# Patient Record
Sex: FEMALE | Race: WHITE
[De-identification: names, ages, dates, MRNs, and addresses within clinical notes are randomized per-mention and may not be internally consistent; named-entity substitution may affect disease eponyms.]

---

## 2017-01-09 ENCOUNTER — HOSPITAL ENCOUNTER (OUTPATIENT)
Dept: HOSPITAL 45 - C.MAMM | Age: 77
Discharge: HOME | End: 2017-01-09
Attending: FAMILY MEDICINE
Payer: COMMERCIAL

## 2017-01-09 DIAGNOSIS — Z12.31: Primary | ICD-10-CM

## 2017-01-10 NOTE — MAMMOGRAPHY REPORT
BILATERAL DIGITAL SCREENING MAMMOGRAM WITH CAD: 1/9/2017

CLINICAL HISTORY: Routine screening.  Patient has no complaints.  





TECHNIQUE: Bilateral CC and MLO views were obtained.  Current study was also evaluated with a Comput
er Aided Detection (CAD) system.  



COMPARISON: Comparison is made to exams dated:  1/7/2016 mammogram, 12/4/2014 mammogram, 12/3/2013 m
ammogram, and 11/29/2012 mammogram - [facility].   



BREAST COMPOSITION:  There are scattered areas of fibroglandular density in both breasts.  



FINDINGS:  There are benign coarse calcifications scattered bilaterally, and stable circumscribed rojas
bcentimeter masses within the right breast.  However, there are possible new faint clustered microca
lcifications in the lower inner middle one third of the left breast, for which additional spot magni
fication views are recommended.



No other new suspicious suspicious mass, architectural distortion or cluster of microcalcifications 
is seen bilaterally.  



IMPRESSION:  ACR BI-RADS CATEGORY 0: INCOMPLETE EVALUATION:  NEED ADDITIONAL IMAGING EVALUATION

The possible new faint clustered microcalcifications in the left breast need additional evaluation. 
 

The patient will be called to schedule an appointment.  





Approximately 10% of breast cancers are not detected with mammography. A negative mammographic repor
t should not delay biopsy if a clinically suggestive mass is present.



Ariana De La Paz M.D.          

ay/:1/9/2017 15:16:13  



Imaging Technologist: Sahara Paz, [facility]

letter sent: Addl Imaging 0  

BI-RADS Code: ACR BI-RADS Category 0: Incomplete Evaluation:  Need Additional Imaging Evaluation

## 2017-01-20 ENCOUNTER — HOSPITAL ENCOUNTER (OUTPATIENT)
Dept: HOSPITAL 45 - C.MAMM | Age: 77
Discharge: HOME | End: 2017-01-20
Attending: FAMILY MEDICINE
Payer: COMMERCIAL

## 2017-01-20 DIAGNOSIS — R92.0: Primary | ICD-10-CM

## 2017-01-20 NOTE — MAMMOGRAPHY REPORT
UNILATERAL LEFT DIGITAL DIAGNOSTIC MAMMOGRAM: 1/20/2017

CLINICAL HISTORY: Callback from screening mammogram for left breast calcifications.  





TECHNIQUE:  Spot magnification left CC and ML views were obtained.



COMPARISON: Comparison is made to exams dated:  1/9/2017 mammogram, 1/7/2016 mammogram, 12/4/2014 ma
mmogram, 12/3/2013 mammogram, 11/29/2012 mammogram, and 11/28/2011 mammogram - [facility].   



BREAST COMPOSITION:  There are scattered areas of fibroglandular density in the left breast.  



FINDINGS: Spot magnification views of the left breast demonstrate loosely grouped coarse benign calc
ifications in the left medial breast at approximately 9:00 which are not significantly changed shaw
red to prior exams.  Associated with the coarse calcifications are grouped faint punctate calcificat
ions which were not clearly seen on prior exams.  The total extent of the fainter calcifications kimberley
sures 10 mm.  The coarse calcifications have the appearance of possible dystrophic calcifications fr
om fat necrosis; the patient reports a prior surgical excisional biopsy of the left breast many year
s ago but is not sure of the location in the breast and a scar is no longer visible on the skin.  As
 the fainter calcifications are in association with the more benign-appearing calcifications, they a
re likely part of the same process and may represent developing dystrophic calcifications.  However,
 they are indeterminate at this time.  Recommend stereotactic biopsy for further evaluation.





IMPRESSION:  ACR BI-RADS CATEGORY 4: SUSPICIOUS

Faint 10 mm group of calcifications in the left 9:00 breast, which are in association with coarse be
nign-appearing calcifications which are likely dystrophic calcifications.  Although they may be part
 of the same process and may represent evolving dystrophic calcifications, they are indeterminant an
d given that they are new, stereotactic biopsy is recommended for further evaluation.  



The patient has been verbally notified of the results.  She didn tentatively scheduled the biopsy be
fore leaving the department. A call was made to the physician's office to confirm faxed results were
 received.  





Approximately 10% of breast cancers are not detected with mammography. A negative mammographic repor
t should not delay biopsy if a clinically suggestive mass is present.



Uma Alfaro M.D.          

ah/:1/20/2017 10:08:03  



Imaging Technologist: Sahara Paz, [facility]

letter sent: Abnormal 4/5  

BI-RADS Code: ACR BI-RADS Category 4: Suspicious

## 2017-01-30 ENCOUNTER — HOSPITAL ENCOUNTER (OUTPATIENT)
Dept: HOSPITAL 45 - C.MAMM | Age: 77
Discharge: HOME | End: 2017-01-30
Attending: FAMILY MEDICINE
Payer: COMMERCIAL

## 2017-01-30 DIAGNOSIS — R92.0: Primary | ICD-10-CM

## 2017-01-30 DIAGNOSIS — N60.92: ICD-10-CM

## 2017-01-30 DIAGNOSIS — N60.22: ICD-10-CM

## 2017-01-30 NOTE — DISCHARGE INSTRUCTIONS
Discharge Instructions


Procedure


Procedure Date:


Jan 30, 2017.


Reason for visit:


Left Calcs.





Discharge


Discharge Date:


Jan 30, 2017.


Discharge Diagnosis:


post left breast stereotactic guided biopsy





Medications


Restart Stopped Medication(s):


May restart Aspirin today





Instructions


Activity Recommendations:  Additional Limitations (see below)


Return to School/Work:  no limitations


Recommended Home Diet:  No Limitations


Provider Instructions:





ACTIVITY RECOMMENDATIONS:





*  No lifting, pushing, pulling or exercising the affected side for three days.








RETURN TO SCHOOL/WORK:





*  You may return to work/school after the procedure, but do not perform any 

strenuous


   activities for 24 to 48 hours.








MEDICATIONS:





*  Tylenol (two 325 mg) every four to six hours if needed for mild pain (if not 

allergic to Tylenol).








DIET:





*  Resume previous diet.








SPECIAL CARE INSTRUCTIONS:





*  Keep biopsy site dry for 24 hours.  May shower after 24 hours, but do not 

soak (bathe)


   incision.





*  May remove Tegaderm (plastic patch) tomorrow AFTER showering.





*  Leave the steri-strips on for one week.  Allow the steri-strips to fall off 

by themselves.  


   If not off after one week, you may remove them.  You may place a Bandaid


   crosswise over the strips, if desired.





*  Apply ice 10 minutes on and 10 minutes off as needed.





*  Wear a bra at bedtime to sleep more comfortably for 2-3 days.





*  Your referring physician should have the results after approximately 5 to 7 

business days.





*  Call for unusual bleeding, fever, drainage, etc or if you have any questions 

call


    [phone] during normal business hours or after hours call Dr De La Paz, 

[phone].








FOLLOW UP VISIT:





Follow-up with Referring Physician as scheduled.





Allergies


Coded Allergies:  


     No Known Allergies (Verified , 8/28/16)


Tereso Chairez Recommendations:


 


Call your doctor if:


*  Temperature above 101 degrees


*  Pain not relieved by pain medicine ordered


*  There is increased drainage or redness from any incision


*  You have any unanswered questions or concerns.





Your Doctors Instructions noted above were prepared by provider Ariana De La Paz.


Patient Signature Section:


 Patient Instructions Signature Page








Jeanne Klinefelter 











Patient (or Guardian) Signature/Date:____________________________________ I 

have read and understand the instructions given to me by my caregivers.








Caregiver/RN/Doctor Signature/Date:____________________________________








The above-named patient and/or guardian has received patient instructions on 

this date.


























+  Original Patient Signature Page (only) stays with chart.  Please make copy 

for patient.

## 2017-01-30 NOTE — MAMMOGRAPHY REPORT
STEREOTACTIC GUIDED BIOPSY LEFT BREAST: 1/30/2017

CLINICAL HISTORY: Indeterminate faint clustered microcalcifications in the lower inner quadrant of t
he left breast near larger dystrophic calcifications.  Patient presents for stereotactic guided biop
sy.  



COMPARISON: Comparison is made to exams dated:  1/20/2017 mammogram, 1/9/2017 mammogram, 12/4/2014 m
ammogram, 12/3/2013 mammogram, 11/28/2011 mammogram, and 11/22/2010 mammogram - [facility].  



PATIENT CONSENT: After explaining the risks, benefits and alternatives of the procedure to the patie
nt, informed consent was obtained both verbally and in writing.  Specific risks include: Bleeding, i
nfection, puncture of adjacent structure, nontarget biopsy, sampling error and medication reaction. 
 



PROCEDURE DESCRIPTION: A time-out was performed and the left breast was confirmed as the site of bio
psy. The patient was placed prone on the stereotactic biopsy table and the breast was placed in CC f
rom below compression. A  image was obtained that demonstrated the clustered microcalcification
s in question.  They are amenable to sterotactic biopsy. Then +15 and -15 stereo pair images were 
obtained. The calcifications were targeted utilizing the coordinates obtained by the computer.  The 
skin was prepped with Betadine. 1% Lidocaine with and without epinipherine was administered as local
 anesthesia. A small skin incision was made.  Through the incision, the needle was inserted to the d
epth determined by the computer. 8 samples were obtained using a [employer] 9-gauge vacuum-assisted
 biopsy device. The specimen radiograph demonstrated no obvious microcalcifications.  Then 10 additi
onal core biopsy samples were obtained.  There was one definite microcalcification but a paucity of 
other microcalcifications in the samples.  A dumbbell shaped metallic biopsy marker was placed at th
e site of this biopsy.  Then the decision was made to re-target and try to obtain additional microca
lcifications.



The left breast was repositioned in CC from below compression. A  image was obtained that demon
strated the clustered microcalcifications in question.  They are amenable to sterotactic biopsy. The
n +15 and -15 stereo pair images were obtained. The calcifications were targeted utilizing the 
rdinates obtained by the computer.  The skin was prepped with Betadine. 1% Lidocaine with and withou
t epinipherine was administered as local anesthesia. A small skin incision was made.  Through the in
cision, the needle was inserted to the depth determined by the computer. 12 samples were obtained us
ing a [employer] 9-gauge vacuum-assisted biopsy device. The specimen radiograph demonstrated severa
l representative microcalcifications therefore a T-shaped metallic biopsy marker was placed at this 
site.  The samples were sent to the pathology department in 2 separate containers from the initial b
iopsy location and then the retargeted location.



Initial post procedure left CC and ML views were obtained which demonstrated a dumbbell shaped metal
lic biopsy marker and no significant hematoma in the lower outer anterior breast, slightly anterior 
to the expected location of the microcalcifications.  CC and ML views obtained after the second biop
sy demonstrated a 6.5 x 4.7 cm hematoma at the site of the biopsy.  Then additional manual compressi
on was held on the breast and the patient was wrapped in a pressure dressing with an Ace bandage bethany
or to leaving our department.







IMPRESSION: STEREOTACTIC GUIDED BIOPSY

Status post left breast stereotactic guided biopsy in the lower inner quadrant of faint punctate satnam
rocalcifications.  2 biopsy attempts were made given a paucity of microcalcifications within the loc
ation which was first sampled.  The specimen radiograph obtained at the second site of biopsy demons
trates adequate microcalcifications in the specimen.  This is denoted by a T-shaped metallic biopsy 
marker.



The patient will receive notification of the biopsy results from her referring physician.





Ariana De La Paz M.D.  

ay/:1/30/2017 15:19:49  



Attending Technologist: Jay TAPIA)(SUNITHA), [facility]

Imaging Technologist: Betty TAPIA)(SUNITHA), [facility]

## 2017-02-01 NOTE — MAMMOGRAPHY REPORT
UNILATERAL LEFT DIGITAL DIAGNOSTIC MAMMOGRAM: 1/30/2017

CLINICAL HISTORY: Indeterminate faint microcalcifications in the left lower inner quadrant, near lar
chance dystrophic appearing calcifications, that could represent fat necrosis, but DCIS cannot be exclu
ded.  



Please refer to the report from left breast stereotactic guided biopsy performed at the same time fo
r full detail.

IMPRESSION:  POST PROCEDURE IMAGING FOR MARKER PLACEMENT

Please refer to the report from left breast stereotactic guided biopsy performed at the same time fo
r full detail.



Approximately 10% of breast cancers are not detected with mammography. A negative mammographic repor
t should not delay biopsy if a clinically suggestive mass is present.



Ariana De La Paz M.D.          

ay/:1/30/2017 13:39:21  



Attending Technologist: Jay Hoover RT(R)(M), [facility]

Imaging Technologist: Betty Corona RT(R)(M), [facility]



BI-RADS Code: Post Procedure Imaging For Marker Placement

## 2017-04-04 ENCOUNTER — HOSPITAL ENCOUNTER (OUTPATIENT)
Dept: HOSPITAL 45 - C.GI | Age: 77
Discharge: HOME | End: 2017-04-04
Attending: INTERNAL MEDICINE
Payer: COMMERCIAL

## 2017-04-04 VITALS
WEIGHT: 165.35 LBS | HEIGHT: 60.98 IN | WEIGHT: 165.35 LBS | BODY MASS INDEX: 31.22 KG/M2 | HEIGHT: 60.98 IN | BODY MASS INDEX: 31.22 KG/M2

## 2017-04-04 VITALS — HEART RATE: 65 BPM | OXYGEN SATURATION: 99 % | DIASTOLIC BLOOD PRESSURE: 77 MMHG | SYSTOLIC BLOOD PRESSURE: 177 MMHG

## 2017-04-04 DIAGNOSIS — I10: ICD-10-CM

## 2017-04-04 DIAGNOSIS — Z86.010: ICD-10-CM

## 2017-04-04 DIAGNOSIS — Z79.82: ICD-10-CM

## 2017-04-04 DIAGNOSIS — E78.00: ICD-10-CM

## 2017-04-04 DIAGNOSIS — K64.8: ICD-10-CM

## 2017-04-04 DIAGNOSIS — E07.9: ICD-10-CM

## 2017-04-04 DIAGNOSIS — Z12.11: Primary | ICD-10-CM

## 2017-04-04 DIAGNOSIS — Z79.899: ICD-10-CM

## 2017-04-04 NOTE — DISCHARGE INSTRUCTIONS
Endoscopy Patient Instructions


Date / Procedure(s) Performed


Apr 4, 2017.


Colonoscopy





Allergy Information


Coded Allergies:  


     No Known Allergies (Verified , 4/4/17)





Discharge Date / Findings


Apr 4, 2017.


Internal hemorrhoids


Normal appearance of anastomosis





Medication Instructions


Stopped Medication(s):  


ASA


OK to resume all medications today as prescribed





 Reported Home Medications








 Medications  Dose


 Route/Sig


 Max Daily Dose Days Date Category


 


 Calcium + D


  (Calcium


 Carbonate-Vitamin


 D) 1 Tab Tab  2 Tab


 PO HS


    3/31/17 Reported


 


 Dulcolax


  (Bisacodyl) 5 Mg


 Tab  1 Tab


 PO QAM


    3/31/17 Reported


 


 Zofran


  (Ondansetron HCl)


 8 Mg Tab  8 Mg


 SL Q6H PRN


    3/31/17 Reported


 


 Fosamax+D


 70MG/2800 Iu


  (Alendronate


 Sodium/Vitamin


 D3) 70 Mg Tab  1 Tablet


 PO WK


    3/31/17 Reported


 


 Tylenol


  (Acetaminophen)


 325 Mg Tab  650 Mg


 PO Q4 PRN


    7/20/16 Reported


 


 Zestoretic


 20MG/12.5MG


  (HCTZ/Lisinopril)


 Tab  1 Tab


 PO QAM


    7/20/16 Reported


 


 Aspirin Ec


  (Aspirin) 81 Mg


 Tab  81 Mg


 PO QAM


    5/8/15 Reported


 


 Lipitor


  (Atorvastatin


 Calcium) 10 Mg Tab  10 Mg


 PO HS


    5/8/15 Reported


 


 Levothyroxine


 Sodium 75 Mcg Tab  75 Mcg


 PO QAM


    5/8/15 Reported


 


 Multivitamin


  (Multiple


 Vitamin) 1 Tab Tab  1 Tablet


 PO HS


    10/5/12 Reported


 


 Norvasc


  (Amlodipine


 Besylate) 5 Mg Tab  5 Mg


 PO QAM


    10/5/12 Reported


 


 Toprol-Xl


  (Metoprolol


 Succinate) 50 Mg


 Tabcr  50 Mg


 PO HS


    10/5/12 Reported











Provider Instructions





Activity Restrictions





-  No exercising or heavy lifting for 24 hours. 


-  Do not drink alcohol the day of the procedure.


-  Do not drive a car or operate machinery until the day after the procedure.


-  Do not make any important decisions or sign important papers in 24 hours 

after the procedure.





Following Day:





-  Return to full activity which may include returning to work/school.





Diet





Start your diet with liquids and light foods (jello, soup, juice, toast).  Then 

eat your usual diet if not nauseated.





Treatment For Common After Affects





For mild abdominal pain, bloating, or excessive gas:





-  Rest


-  Eat lightly


-  Lie on right side





Follow-Up Information


Follow-up with Dr. Lopes as scheduled





Anesthesia Information





What You Should Know





You have had a procedure that required some medicine to reduce anxiety and 

discomfort. This treatment is called moderate sedation.  


After receiving the treatment, you may be sleepy, but you will be able to 

breathe on your own.  The effects of the treatment may last for several hours.








Follow these instructions along with Activity/Diet recommendations noted above:





*  Do NOT do anything where dizziness or clumsiness would be dangerous.





*  Rest quietly at home today, then you can be up and about tomorrow.





*  Have a responsible person stay with you the rest of today.





*  You may have had an I.V. today.  If so, you may take the dressing off later 

today.





Recommendations


 


Call your doctor if:





*  Trouble breathing 





*  Continuous vomiting for more than 24 hours





*  Temperature above 101 degrees





*  Severe abdominal pain or bloating





*  Pain not relieved by pain medicine ordered





*  There is increased drainage or redness from any incision





*  A large amount of rectal bleeding greater than 2-3 tablespoons. 


   (If you had a polyp/s removed or have hemorrhoids, a small amount of blood -


    from the rectum is to be expected.)





*  You have any unanswered questions or concerns.





IN THE EVENT OF A SERIOUS EMERGENCY, GO TO THE NEAREST EMERGENCY ROOM





       Your discharge instructions were prepared by provider Cas Paige.


 Patient Instructions Signature Page








Jeanne Klinefelter 











Patient (or Guardian) Signature/Date:____________________________________ I 

have read and understand the instructions given to me by my caregivers.








Caregiver/RN/Doctor Signature/Date:____________________________________








The above-named patient and/or guardian has received patient instructions on 

this date.


























+  Original Patient Signature Page (only) stays with chart.  Please make copy 

for patient.

## 2017-04-04 NOTE — GI REPORT
Procedure Date: 4/4/2017 12:23 PM

Procedure:            Colonoscopy

Indications:          High risk colon cancer surveillance: Personal history 

                      of colon cancer

Medicines:            Monitored Anesthesia Care

Complications:        No immediate complications.

Estimated Blood Loss: Estimated blood loss: none.

Procedure:            Pre-Anesthesia Assessment:

                      - Prior to the procedure, a History and Physical was 

                      performed, and patient medications and allergies were 

                      reviewed. The patient's tolerance of previous 

                      anesthesia was also reviewed. The risks and benefits of 

                      the procedure and the sedation options and risks were 

                      discussed with the patient. All questions were 

                      answered, and informed consent was obtained. Prior 

                      Anticoagulants: The patient has taken no previous 

                      anticoagulant or antiplatelet agents. ASA Grade 

                      Assessment: III - A patient with severe systemic 

                      disease. After reviewing the risks and benefits, the 

                      patient was deemed in satisfactory condition to undergo 

                      the procedure.

                      After I obtained informed consent, the scope was passed 

                      under direct vision. Throughout the procedure, the 

                      patient's blood pressure, pulse, and oxygen saturations 

                      were monitored continuously. The scope was introduced 

                      through the anus and advanced to the ileocolonic 

                      anastomosis. The colonoscopy was performed without 

                      difficulty. The patient tolerated the procedure well. 

                      The quality of the bowel preparation was good. The 

                      rectum was photographed.

Findings:

     There was evidence of a prior end-to-side ileo-colonic anastomosis in 

     the ascending colon. This was patent and was characterized by healthy 

     appearing mucosa.

     Non-bleeding internal hemorrhoids were found during retroflexion. The 

     hemorrhoids were small.

Impression:           - Patent end-to-side ileo-colonic anastomosis, 

                      characterized by healthy appearing mucosa.

                      - Non-bleeding internal hemorrhoids.

                      - No specimens collected.

Recommendation:       - Resume previous diet.

                      - Continue present medications.

                      - Repeat colonoscopy in 3 years for surveillance.

                      - Return to primary care physician as previously 

                      scheduled.

Cas Paige DO

4/4/2017 1:12:36 PM

This report has been signed electronically.

Note Initiated On: 4/4/2017 12:23 PM

     I attest to the content of the Intraoperative Record and orders 

     documented therein, exceptions below

## 2017-04-04 NOTE — ANESTHESIOLOGY PROGRESS NOTE
Anesthesia Post Op Note


Date & Time


Apr 4, 2017 at 13:37





Vital Signs


Pain Intensity:  0





 Vital Signs Past 12 Hours








  Date Time  Temp Pulse Resp B/P Pulse Ox O2 Delivery O2 Flow Rate FiO2


 


4/4/17 13:16  65 18 177/77 99 Room Air  


 


4/4/17 13:01  65 18 179/73 99 Room Air  


 


4/4/17 12:46  82 18 146/76 99 Room Air  


 


4/4/17 11:50 36.9 65 18 155/63 98 Room Air  











Notes


Mental Status:  alert / awake / arousable, participated in evaluation


Pt Amnestic to Procedure:  Yes


Nausea / Vomiting:  adequately controlled


Pain:  adequately controlled


Airway Patency, RR, SpO2:  stable & adequate


BP & HR:  stable & adequate


Hydration State:  stable & adequate


Anesthetic Complications:  no major complications apparent

## 2017-04-04 NOTE — ENDO HISTORY AND PHYSICAL
History & Physical


Date of Service:


2017.


Chief Complaint:


History of colon cancer


Referring Physician:


Dr. Lopes


History of Present Illness


75 yo CF who presents for colonoscopy secondary to history of colon cancer.





Past Medical History


Cancer, High Cholesterol, Hypertension, Thyroid Disease





Past Surgical History


Hx Cardiac Surgery:  No


Hx Internal Defibrillator:  No


Hx Pacemaker:  No


Hx Abdominal Surgery:  Yes (ANDREA, BILE DUCT STENT AND REMOVAL,  X3)


Hx of Implantable Prosthesis:  No


Hx Post-Op Nausea and Vomiting:  Yes


Hx Cancer Surgery:  Yes (COLON RESECTION)


Hx Thoracic Surgery:  No


Hx Orthopedic:  Yes (RT LEG SURGERY WITH PIN)


Hx Urinary Tract Surgery:  No





Family History


Colon CA, Polyp





Social History


Smoking Status:  Never Smoker


Hx Substance Use:  No


Hx Alcohol Use:  No





Allergies


Coded Allergies:  


     No Known Allergies (Verified , 3/31/17)





Current Medications





 Reported Home Medications








 Medications  Dose


 Route/Sig


 Max Daily Dose Days Date Category


 


 Dulcolax


  (Bisacodyl) 5 Mg


 Tab  1 Tab


 PO QAM


    3/31/17 Reported


 


 Zofran


  (Ondansetron HCl)


 8 Mg Tab  8 Mg


 SL Q6H PRN


    3/31/17 Reported


 


 Fosamax+D


 70MG/2800 Iu


  (Alendronate


 Sodium/Vitamin


 D3) 70 Mg Tab  1 Tablet


 PO WK


    3/31/17 Reported


 


 Tylenol


  (Acetaminophen)


 325 Mg Tab  650 Mg


 PO Q4 PRN


    16 Reported


 


 Zestoretic


 20MG/12.5MG


  (HCTZ/Lisinopril)


 Tab  1 Tab


 PO QAM


    16 Reported


 


 Aspirin Ec


  (Aspirin) 81 Mg


 Tab  81 Mg


 PO QAM


    5/8/15 Reported


 


 Lipitor


  (Atorvastatin


 Calcium) 10 Mg Tab  10 Mg


 PO HS


    5/8/15 Reported


 


 Levothyroxine


 Sodium 75 Mcg Tab  75 Mcg


 PO QAM


    5/8/15 Reported


 


 Multivitamin


  (Multiple


 Vitamin) 1 Tab Tab  1 Tablet


 PO HS


    10/5/12 Reported


 


 Norvasc


  (Amlodipine


 Besylate) 5 Mg Tab  5 Mg


 PO QAM


    10/5/12 Reported


 


 Toprol-Xl


  (Metoprolol


 Succinate) 50 Mg


 Tabcr  50 Mg


 PO HS


    10/5/12 Reported


 


 Calcium + D


  (Calcium


 Carbonate-Vitamin


 D) 1 Tab Tab  2 Tab


 PO HS


    3/31/17 Reported











Vital Signs


Weight (Kilograms):  75


Height (Feet):  5


Height (Inches):  1





Physical Exam


General Appearance:  WD/WN, no apparent distress


Respiratory/Chest:  


   Auscultation:  breath sounds normal


Cardiovascular:  


   Heart Auscultation:  RRR


Abdomen:  


   Bowel Sounds:  normal


   Inspection & Palpation:  soft, non-distended, no tenderness, guarding & 

rebound





Assessment and Plan


Assessment:


75 yo CF who presents for colonoscopy secondary to history of colon cancer.








Plan:


Proceed with colonoscopy.

## 2017-06-10 ENCOUNTER — HOSPITAL ENCOUNTER (OUTPATIENT)
Dept: HOSPITAL 45 - C.LABPVFM | Age: 77
Discharge: HOME | End: 2017-06-10
Attending: FAMILY MEDICINE
Payer: COMMERCIAL

## 2017-06-10 DIAGNOSIS — E03.9: ICD-10-CM

## 2017-06-10 DIAGNOSIS — E78.5: ICD-10-CM

## 2017-06-10 DIAGNOSIS — R73.01: ICD-10-CM

## 2017-06-10 DIAGNOSIS — I10: Primary | ICD-10-CM

## 2017-06-10 LAB
ALBUMIN/GLOB SERPL: 1 {RATIO} (ref 0.9–2)
ALP SERPL-CCNC: 48 U/L (ref 45–117)
ALT SERPL-CCNC: 19 U/L (ref 12–78)
ANION GAP SERPL CALC-SCNC: 8 MMOL/L (ref 3–11)
AST SERPL-CCNC: 15 U/L (ref 15–37)
BUN SERPL-MCNC: 20 MG/DL (ref 7–18)
BUN/CREAT SERPL: 22.2 (ref 10–20)
CALCIUM SERPL-MCNC: 9.3 MG/DL (ref 8.5–10.1)
CHLORIDE SERPL-SCNC: 103 MMOL/L (ref 98–107)
CHOLEST/HDLC SERPL: 2.2 {RATIO}
CO2 SERPL-SCNC: 30 MMOL/L (ref 21–32)
CREAT SERPL-MCNC: 0.92 MG/DL (ref 0.6–1.2)
EST. AVERAGE GLUCOSE BLD GHB EST-MCNC: 120 MG/DL
GLOBULIN SER-MCNC: 3.5 GM/DL (ref 2.5–4)
GLUCOSE SERPL-MCNC: 90 MG/DL (ref 70–99)
GLUCOSE UR QL: 66 MG/DL
KETONES UR QL STRIP: 61 MG/DL
NITRITE UR QL STRIP: 82 MG/DL (ref 0–150)
PH UR: 143 MG/DL (ref 0–200)
POTASSIUM SERPL-SCNC: 3.8 MMOL/L (ref 3.5–5.1)
SODIUM SERPL-SCNC: 141 MMOL/L (ref 136–145)
TSH SERPL-ACNC: 1.79 UIU/ML (ref 0.3–4.5)
VERY LOW DENSITY LIPOPROT CALC: 16 MG/DL

## 2017-09-13 ENCOUNTER — HOSPITAL ENCOUNTER (OUTPATIENT)
Dept: HOSPITAL 45 - C.EDB | Age: 77
Setting detail: OBSERVATION
LOS: 2 days | Discharge: HOME | End: 2017-09-15
Attending: FAMILY MEDICINE | Admitting: HOSPITALIST
Payer: COMMERCIAL

## 2017-09-13 VITALS
BODY MASS INDEX: 32.01 KG/M2 | WEIGHT: 169.54 LBS | WEIGHT: 169.54 LBS | BODY MASS INDEX: 32.01 KG/M2 | HEIGHT: 61 IN | HEIGHT: 61 IN

## 2017-09-13 DIAGNOSIS — Z82.49: ICD-10-CM

## 2017-09-13 DIAGNOSIS — Z85.038: ICD-10-CM

## 2017-09-13 DIAGNOSIS — E78.00: ICD-10-CM

## 2017-09-13 DIAGNOSIS — Z79.899: ICD-10-CM

## 2017-09-13 DIAGNOSIS — K92.1: ICD-10-CM

## 2017-09-13 DIAGNOSIS — K62.5: Primary | ICD-10-CM

## 2017-09-13 DIAGNOSIS — I10: ICD-10-CM

## 2017-09-13 DIAGNOSIS — E03.9: ICD-10-CM

## 2017-09-13 DIAGNOSIS — K59.00: ICD-10-CM

## 2017-09-13 DIAGNOSIS — E78.5: ICD-10-CM

## 2017-09-13 DIAGNOSIS — M81.0: ICD-10-CM

## 2017-09-13 DIAGNOSIS — Z79.82: ICD-10-CM

## 2017-09-14 VITALS
DIASTOLIC BLOOD PRESSURE: 103 MMHG | OXYGEN SATURATION: 99 % | SYSTOLIC BLOOD PRESSURE: 160 MMHG | HEART RATE: 77 BPM | TEMPERATURE: 98.06 F

## 2017-09-14 VITALS
SYSTOLIC BLOOD PRESSURE: 165 MMHG | OXYGEN SATURATION: 97 % | DIASTOLIC BLOOD PRESSURE: 75 MMHG | HEART RATE: 61 BPM | TEMPERATURE: 98.24 F

## 2017-09-14 VITALS
DIASTOLIC BLOOD PRESSURE: 80 MMHG | TEMPERATURE: 98.06 F | OXYGEN SATURATION: 97 % | HEART RATE: 79 BPM | SYSTOLIC BLOOD PRESSURE: 132 MMHG

## 2017-09-14 VITALS
TEMPERATURE: 98.06 F | HEART RATE: 59 BPM | OXYGEN SATURATION: 97 % | SYSTOLIC BLOOD PRESSURE: 154 MMHG | DIASTOLIC BLOOD PRESSURE: 72 MMHG

## 2017-09-14 VITALS — OXYGEN SATURATION: 99 %

## 2017-09-14 VITALS — HEART RATE: 61 BPM | DIASTOLIC BLOOD PRESSURE: 75 MMHG | SYSTOLIC BLOOD PRESSURE: 165 MMHG | TEMPERATURE: 98.24 F

## 2017-09-14 VITALS — HEART RATE: 87 BPM | SYSTOLIC BLOOD PRESSURE: 140 MMHG | DIASTOLIC BLOOD PRESSURE: 75 MMHG

## 2017-09-14 LAB
AMYLASE SERPL-CCNC: 48 U/L (ref 25–115)
ANION GAP SERPL CALC-SCNC: 4 MMOL/L (ref 3–11)
ANION GAP SERPL CALC-SCNC: 5 MMOL/L (ref 3–11)
BASOPHILS # BLD: 0.02 K/UL (ref 0–0.2)
BASOPHILS NFR BLD: 0.2 %
BUN SERPL-MCNC: 19 MG/DL (ref 7–18)
BUN SERPL-MCNC: 23 MG/DL (ref 7–18)
BUN/CREAT SERPL: 26.9 (ref 10–20)
BUN/CREAT SERPL: 27.4 (ref 10–20)
CALCIUM SERPL-MCNC: 8.7 MG/DL (ref 8.5–10.1)
CALCIUM SERPL-MCNC: 9.1 MG/DL (ref 8.5–10.1)
CHLORIDE SERPL-SCNC: 104 MMOL/L (ref 98–107)
CHLORIDE SERPL-SCNC: 105 MMOL/L (ref 98–107)
CO2 SERPL-SCNC: 30 MMOL/L (ref 21–32)
CO2 SERPL-SCNC: 31 MMOL/L (ref 21–32)
COMPLETE: YES
CREAT CL PREDICTED SERPL C-G-VRATE: 53.9 ML/MIN
CREAT CL PREDICTED SERPL C-G-VRATE: 63.1 ML/MIN
CREAT SERPL-MCNC: 0.7 MG/DL (ref 0.6–1.2)
CREAT SERPL-MCNC: 0.82 MG/DL (ref 0.6–1.2)
EOSINOPHIL NFR BLD AUTO: 167 K/UL (ref 130–400)
GLUCOSE SERPL-MCNC: 112 MG/DL (ref 70–99)
GLUCOSE SERPL-MCNC: 145 MG/DL (ref 70–99)
HCT VFR BLD CALC: 39 % (ref 37–47)
HCT VFR BLD CALC: 39.2 % (ref 37–47)
HCT VFR BLD CALC: 40.8 % (ref 37–47)
HCT VFR BLD CALC: 41.6 % (ref 37–47)
IG%: 0.1 %
IMM GRANULOCYTES NFR BLD AUTO: 23.3 %
INR PPP: 0.9 (ref 0.9–1.1)
LYMPHOCYTES # BLD: 2.05 K/UL (ref 1.2–3.4)
MAGNESIUM SERPL-MCNC: 2.3 MG/DL (ref 1.8–2.4)
MCH RBC QN AUTO: 30.7 PG (ref 25–34)
MCHC RBC AUTO-ENTMCNC: 34.1 G/DL (ref 32–36)
MCV RBC AUTO: 89.8 FL (ref 80–100)
MONOCYTES NFR BLD: 10.4 %
NEUTROPHILS # BLD AUTO: 3.1 %
NEUTROPHILS NFR BLD AUTO: 62.9 %
PARTIAL THROMBOPLASTIN RATIO: 1
PMV BLD AUTO: 9.6 FL (ref 7.4–10.4)
POTASSIUM SERPL-SCNC: 3.4 MMOL/L (ref 3.5–5.1)
POTASSIUM SERPL-SCNC: 3.4 MMOL/L (ref 3.5–5.1)
PROTHROMBIN TIME: 10 SECONDS (ref 9–12)
RBC # BLD AUTO: 4.63 M/UL (ref 4.2–5.4)
SODIUM SERPL-SCNC: 139 MMOL/L (ref 136–145)
SODIUM SERPL-SCNC: 140 MMOL/L (ref 136–145)
WBC # BLD AUTO: 8.81 K/UL (ref 4.8–10.8)

## 2017-09-14 RX ADMIN — LEVOTHYROXINE SODIUM SCH MCG: 75 TABLET ORAL at 08:02

## 2017-09-14 RX ADMIN — METRONIDAZOLE SCH MG: 500 TABLET ORAL at 15:43

## 2017-09-14 RX ADMIN — AMLODIPINE BESYLATE SCH MG: 5 TABLET ORAL at 08:02

## 2017-09-14 RX ADMIN — CIPROFLOXACIN SCH MG: 500 TABLET, FILM COATED ORAL at 20:10

## 2017-09-14 RX ADMIN — BISACODYL SCH MG: 5 TABLET, COATED ORAL at 08:01

## 2017-09-14 RX ADMIN — METRONIDAZOLE SCH MG: 500 TABLET ORAL at 20:10

## 2017-09-14 RX ADMIN — Medication SCH GM: at 08:01

## 2017-09-14 NOTE — DIAGNOSTIC IMAGING REPORT
ABD/PELVIS IV AND ORAL CONT



CLINICAL HISTORY: 77 years-old Female presenting with rectal bleeding, LLQ pain.





TECHNIQUE: Multidetector CT of the abdomen and pelvis was performed after the

administration of oral and intravenous contrast. IV contrast: 119 mL of Optiray

320. A dose lowering technique was used consistent with the principles of ALARA

(as low as reasonably achievable). 



COMPARISON: CT of the abdomen from 8/8/2016.



CT DOSE (mGy.cm): The estimated cumulative dose is 684.21 mGy.cm.



FINDINGS:



 topogram: Unremarkable.



Lung bases: Lung bases clear. Mosaic attenuation may suggest small airways

disease. Coronary artery calcification. Normal heart size. No pericardial or

pleural effusion.



Liver: Normal morphology. No liver lesion. Patent hepatic vasculature.



Biliary: No intrahepatic or extrahepatic biliary ductal dilatation. Gallbladder

surgically absent.



Pancreas: Mild peripancreatic fat stranding along the uncinate and surrounding

the superior mesenteric vein and artery. No peripancreatic fluid. Minimal

stranding within the transverse mesocolon.



Spleen: Normal.



Adrenal glands: Normal.



Kidneys and ureters: Normal. No hydronephrosis.



Bladder: Apparent bladder wall thickening and irregularity may be due to

underdistention.



Pelvic organs: Calcified fibroids noted. Normal ovaries.



Bowel: Oral contrast has reached the rectum. Significant wall thickening at the

colocolonic anastomosis in the left lower quadrant with mild surrounding

pericolonic fat infiltration. No evidence of obstruction. The appendix is not

present.



Peritoneal cavity: No free fluid or intraperitoneal gas.



Vasculature: Atherosclerosis of the normal caliber abdominal aorta.



Lymph nodes: Few scattered subcentimeter retroperitoneal lymph nodes. Few

prominent lymph nodes in the mesentery. No pathologically enlarged lymph nodes

by CT size criteria.



Abdominal wall: Postsurgical changes in the midline lower abdomen.



Musculoskeletal: Degenerative changes of the spine.



IMPRESSION:

1.  Significant wall thickening at the left lower quadrant colocolonic

anastomosis, consistent with colitis. This could represent ischemia or an

infectious etiology. The absence of lymphadenopathy suggests against underlying

neoplasm, however, direct visualization could be considered.



2.  Peripancreatic fat stranding at the level of the uncinate surrounding the

superior mesenteric vein and artery with associated stranding in the transverse

mesocolon. These changes are likely chronic and related to scarring, although

superimposed acute interstitial edematous pancreatitis cannot be excluded.

Correlate with lipase.



3.  Apparent bladder wall thickening and irregularity may be due to

underdistention.











Electronically signed by:  Rowdy Ron M.D.

9/14/2017 1:23 PM



Dictated Date/Time:  9/14/2017 1:14 PM

## 2017-09-14 NOTE — EMERGENCY ROOM VISIT NOTE
History


Report prepared by Kavitha:  Franklyn Smith


Under the Supervision of:  Dr. Christal Alberto D.O.


First contact with patient:  23:50


Chief Complaint:  RECTAL BLEEDING


Stated Complaint:  BLEEDING FROM RECTUM





History of Present Illness


The patient is a 77 year old female who presents to the Emergency Room with 

complaints of persistent rectal bleeding beginning three hours ago. The patient 

has a history of colon cancer, and has had a colon resection. She has regular 

follow-up colonoscopies, with her most recent colonoscopy being in April. Her 

most recent colonoscopy was normal. The patient states that she has not had 

bleeding this severe even with her previous cancer. She describes her bleeding 

as "bright red, with some dark clots". She also complains of lower abdominal 

pain. The patient states that her stool has appeared normal over the past few 

weeks. She notes that she has a history of chronic constipation, and often 

takes laxatives. She notes that she did not take laxatives today, and was 

constipated today. The patient denies any diarrhea. She is on aspirin.





   Source of History:  patient


   Onset:  Three hours ago


   Position:  other (rectum)


   Quality:  other (bleeding)


   Timing:  other (persistent)


   Associated Symptoms:  + abdominal pain (lower), No diarrhea


Note:


Additional symptoms: constipation.





Review of Systems


See HPI for pertinent positives & negatives. A total of 10 systems reviewed and 

were otherwise negative.





Past Medical & Surgical


Medical Problems:


(1) Choledocholithiasis with obstruction


(2) Fall


(3) Fracture of tibia and fibula


(4) Gastrocnemius muscle tear


(5) High cholesterol


(6) Hypertension


(7) Tibia/fibula fracture


(8) Vertigo








Family History





Diabetes mellitus


Heart disease





Social History


Smoking Status:  Never Smoker


Drug Use:  none


Marital Status:  


Housing Status:  lives with family


Occupation Status:  retired





Current/Historical Medications


Scheduled


Alendronate/Cholecalciferol (Fosamax+D 70MG/2800 Iu), 1 TABLET PO WK


Amlodipine Besylate (Norvasc), 5 MG PO QAM


Aspirin (Aspirin Ec), 81 MG PO QAM


Atorvastatin (Lipitor), 10 MG PO HS


Bisacodyl (Dulcolax), 1 TAB PO QAM


Calcium Carbonate-Vitamin D (Calcium + D), 2 TAB PO HS


Levothyroxine Sodium (Levothyroxine Sodium), 75 MCG PO QAM


Lisinopril/Hctz (Zestoretic 20MG/12.5MG), 1 TAB PO QAM


Metoprolol Succ (Toprol Xl) (Toprol-Xl), 50 MG PO HS


Multiple Vitamin (Multivitamin), 1 TABLET PO HS





Scheduled PRN


Ondansetron Hcl (Zofran), 8 MG SL Q6H PRN for Nausea





Allergies


Coded Allergies:  


     No Known Allergies (Verified , 9/13/17)





Physical Exam


Vital Signs











  Date Time  Temp Pulse Resp B/P (MAP) Pulse Ox O2 Delivery O2 Flow Rate FiO2


 


9/14/17 01:21  60 16 143/86 97 Room Air  


 


9/14/17 00:39  68      


 


9/13/17 23:35 37.1 74 20 194/113 96 Room Air  











Physical Exam


HEENT: Head - normocephalic and atraumatic   Pupils are equal, round, and 

reactive to light.  Extraocular eye muscles are intact, and sclera are 

anicteric.   Nose -  moist nasal mucosa without discharge. Mouth - moist buccal 

mucosa.  Oropharynx is nonerythematous and there is no tonsillar exudate or 

edema noted.


Neck: Supple; no JVD, nuchal rigidity, cervical lymphadenopathy.


Heart: Regular rate and rhythm.  There is a normal S1 and S2 with no murmurs, 

clicks, or gallops appreciated.


Lungs: Clear to auscultation bilaterally with no wheezes, rales, or rhonchi.


Abdomen: Soft, completely nontender, nondistended, with good bowel sounds.  

There are no palpable pulsatile masses or hepatosplenomegaly.  There is no 

guarding, rigidity, or rebound noted.


Rectal: Small external hemorrhoids.  Palpable internal hemorrhoids.  No masses 

appreciated.  Gross red blood per rectum. 


Extremities: No evidence of cyanosis, or clubbing.  There are easily palpable 

peripheral pulses. Trace edema bilateral lower extremities.


Skin: warm and dry with good turgor and no rashes.





Medical Decision & Procedures


Laboratory Results


9/14/17 00:12








Red Blood Count 4.63, Mean Corpuscular Volume 89.8, Mean Corpuscular Hemoglobin 

30.7, Mean Corpuscular Hemoglobin Concent 34.1, Mean Platelet Volume 9.6, 

Neutrophils (%) (Auto) 62.9, Lymphocytes (%) (Auto) 23.3, Monocytes (%) (Auto) 

10.4, Eosinophils (%) (Auto) 3.1, Basophils (%) (Auto) 0.2, Neutrophils # (Auto

) 5.54, Lymphocytes # (Auto) 2.05, Monocytes # (Auto) 0.92, Eosinophils # (Auto

) 0.27, Basophils # (Auto) 0.02














Test


  9/14/17


00:12


 


White Blood Count


  8.81 K/uL


(4.8-10.8)


 


Red Blood Count


  4.63 M/uL


(4.2-5.4)


 


Hemoglobin


  14.2 g/dL


(12.0-16.0)


 


Hematocrit 41.6 % (37-47) 


 


Mean Corpuscular Volume


  89.8 fL


()


 


Mean Corpuscular Hemoglobin


  30.7 pg


(25-34)


 


Mean Corpuscular Hemoglobin


Concent 34.1 g/dl


(32-36)


 


Platelet Count


  167 K/uL


(130-400)


 


Mean Platelet Volume


  9.6 fL


(7.4-10.4)


 


Neutrophils (%) (Auto) 62.9 % 


 


Lymphocytes (%) (Auto) 23.3 % 


 


Monocytes (%) (Auto) 10.4 % 


 


Eosinophils (%) (Auto) 3.1 % 


 


Basophils (%) (Auto) 0.2 % 


 


Neutrophils # (Auto)


  5.54 K/uL


(1.4-6.5)


 


Lymphocytes # (Auto)


  2.05 K/uL


(1.2-3.4)


 


Monocytes # (Auto)


  0.92 K/uL


(0.11-0.59)


 


Eosinophils # (Auto)


  0.27 K/uL


(0-0.5)


 


Basophils # (Auto)


  0.02 K/uL


(0-0.2)


 


RDW Standard Deviation


  43.6 fL


(36.4-46.3)


 


RDW Coefficient of Variation


  13.3 %


(11.5-14.5)


 


Immature Granulocyte % (Auto) 0.1 % 


 


Immature Granulocyte # (Auto)


  0.01 K/uL


(0.00-0.02)


 


Prothrombin Time


  10.0 SECONDS


(9.0-12.0)


 


Prothromb Time International


Ratio 0.9 (0.9-1.1) 


 


 


Activated Partial


Thromboplast Time 25.3 SECONDS


(21.0-31.0)


 


Partial Thromboplastin Ratio 1.0 


 


Est Creatinine Clear Calc


Drug Dose 53.9 ml/min 


 





Laboratory results per my review.





ED Course


2357: Past medical records reviewed. I reviewed her colonoscopy report from 

April 4th, 2017. She had healthy appearing mucosa at this time. Patent end to 

end ileocolonic anastomosis. Non-bleeding internal hemorrhoids.





 The patient was evaluated in room B9. A complete history and physical exam was 

performed.  An IV lock was initiated and labs are drones above.  The patient 

was typed and screened.





0130: Upon reevaluation, I discussed findings and results with the patient. She 

verbalized agreement of the treatment plan. I spoke with Dr. Harvey of the [facility] [place] 

Hospitalist Service. The patient will be evaluated for further management and 

care. 





0340: The patient had a very large, bloody bowel movement.  Her vital signs 

remain stable.





Medical Decision


The patient is a 77 year old female who presents to the ED with rectal 

bleeding. Differential diagnosis includes but is not limited to; anemia, 

diverticular bleed, internal hemorrhoid, and GI bleeding with history of colon 

cancer.  





Laboratory studies: 


Stable H&H with a hemoglobin of 14.2. Normal white blood cell count. Normal 

Coags. BUN 23. Creatinine 0.8. Glucose 145. 





This is a 77-year-old female who presents with a sudden onset of bright red 

rectal bleeding.  The patient has no reproducible abdominal pain on physical 

exam.  She does describe some crampy abdominal pain prior to these bloody bowel 

movements.  The patient is not anemic.  She currently remains hemodynamically 

stable.  I discussed the case with the [facility] hospitalist and they will 

evaluate for further management.





Medication Reconcilliation


Current Medication List:  was personally reviewed by me





Blood Pressure Screening


Patient's blood pressure:  Elevated blood pressure


Blood pressure disposition:  Referred to PCP





Consults


Time Called:  0127


Consulting Physician:  Dr. Harvey -[facility] [place] [facility]


Returned Call:  0150


Discussed the patient's case. The patient will be evaluated for further 

management.





Impression





 Primary Impression:  


 Lower GI bleed





Scribe Attestation


The scribe's documentation has been prepared under my direction and personally 

reviewed by me in its entirety. I confirm that the note above accurately 

reflects all work, treatment, procedures, and medical decision making performed 

by me.





Departure Information


Dispostion


Being Evaluated By Hospitalist





Referrals


No Doctor, Assigned (PCP)





Patient Instructions


[facility]

## 2017-09-14 NOTE — DISCHARGE SUMMARY
Discharge Summary


Date of Service


Sep 14, 2017.


 (Fly Bey M.D.)





Discharge Summary


Admission Date:


Sep 14, 2017 at 03:07


Discharge Date:  Sep 15, 2017


Discharge Disposition:  Home


Principal Diagnosis:  GI bleed


Problems/Secondary Diagnoses:


Constipation


Immunizations:  


   Have You Had Influenza Vaccine:  Unknown


   History of Tetanus Vaccine?:  Unknown


   History of Pneumococcal:  Unknown


   History of Hepatitis B Vaccine:  Unknown


Procedures:


CT abdomen/pelvis with IV and PO contrast on 14Sep2017


1.  Significant wall thickening at the left lower quadrant colocolonic


anastomosis, consistent with colitis. This could represent ischemia or an


infectious etiology. The absence of lymphadenopathy suggests against underlying


neoplasm, however, direct visualization could be considered.


2.  Peripancreatic fat stranding at the level of the uncinate surrounding the


superior mesenteric vein and artery with associated stranding in the transverse


mesocolon. These changes are likely chronic and related to scarring, although


superimposed acute interstitial edematous pancreatitis cannot be excluded.


Correlate with lipase.


3.  Apparent bladder wall thickening and irregularity may be due to


underdistention.


Consultations:


Gastroenterology 


 (Fly Bey M.D.)


Principal Diagnosis:  hematochezia


 (Srini Westbrook MD)





Medication Reconciliation


New Medications:  


Ciprofloxacin (Ciprofloxacin HCl) 500 Mg Tab


500 MG PO BID for 9 Days, #18 TAB 0 Refills


as antibiotic for intestines


Metronidazole (Metronidazole) 500 Mg Tab


500 MG PO TID for 9 Days, #27 TAB 0 Refills


As antibiotic for intestines


 


Continued Medications:  


Alendronate/Cholecalciferol (Fosamax+D 70MG/2800 Iu) 70 Mg Tab


1 TABLET PO WK


TAKES ON THURSDAYS.


Amlodipine Besylate (Norvasc) 5 Mg Tab


5 MG PO QAM, TAB





Aspirin (Aspirin Ec) 81 Mg Tab


81 MG PO QAM





Atorvastatin (Lipitor) 10 Mg Tab


10 MG PO HS, TAB





Bisacodyl (Dulcolax) 5 Mg Tab


1 TAB PO QAM





Calcium Carbonate-Vitamin D (Calcium + D) 1 Tab Tab


2 TAB PO HS





Levothyroxine Sodium (Levothyroxine Sodium) 75 Mcg Tab


75 MCG PO QAM





Lisinopril/Hctz (Zestoretic 20MG/12.5MG)  Tab


1 TAB PO QAM, TAB





Metoprolol Succ (Toprol Xl) (Toprol-Xl) 50 Mg Tabcr


50 MG PO HS





Multiple Vitamin (Multivitamin) 1 Tab Tab


1 TABLET PO HS





Ondansetron Hcl (Zofran) 8 Mg Tab


8 MG SL Q6H PRN for Nausea











Discharge Exam


Review of Systems:  


   Constitutional:  No fever, No chills


   Respiratory:  No cough, No shortness of breath


   Cardiovascular:  No chest pain, No edema


   Abdomen:  No pain, No nausea, No vomiting, No diarrhea


Physical Exam:  


   General Appearance:  WD/WN, no apparent distress


   Respiratory/Chest:  lungs clear, normal breath sounds


   Cardiovascular:  regular rate, rhythm, no edema


   Abdomen / GI:  normal bowel sounds, non tender, soft


 (Fly Bey M.D.)





Hospital Course


H&P on admission intake info early AM 14Sep2017


Mrs Klinefelter is a 77 year old female with pertinent history of colon cancer s

/p chemotherapy and partial colectomy who presents to the ER with rectal 

bleeding. Started around 9pm. Bright red rectal bleeding with clots present. 5-

6 bloody bowel movements since then with large clots (around the size of a fist)

. Associated LLQ pain only with bowel movements; no pain currently. No nausea 

or vomiting. Known internal hemorrhoids - colonoscopy on 4/4/17 but never had 

them bleed before. No diverticulosis on that colonoscopy. She has been 

constipated without a bowel movement (except for recent blood) for the last 3 

days. Takes Dulcolax BID and sometimes takes MiraLAX. Passing flatus.  She 

denies any chest pain, shortness of breath, syncope, presyncope or vision 

changes.





Discharge summary: 





77 year old female admitted on 14Sep2017 for rectal bleeding.  





Rectal bleeding: Admitted with BRBPR.  Hx colon cancer s/p chemo and partial 

colectomy, though reports of normal colonoscopy in April 2017.  Did notice 

internal hemmorroids at that time.  While an inpt, pt said she had a bit more 

bleeding with attempted BM early in the morning, but then it resolved with 

actual BM later in the day.  Serial Hb remained stable around 13.  

Gastroenterology was consulted and recommended a CT a/p with contrast, results 

noted above.  Amylase and lipase were normal.  Concern is that bleeding was 

from ischemia, though no precipitating event is known.  Was placed on Cipro and 

flagyl, planned 10 day course, to prevent secondary bacterial translocation.  

Also recommended regular bowel regimen with patient to include daily use of 

MiraLAX 17 grams to reduce constipation, dulcolax prn, as well as to ensure 

adequate fluid and fiber in the diet.  They recommended outpt follow up in next 

1-2 weeks in their office and a CT a/p in one month.  





Constipation: Says she hasn't had a BM since around 11Sep.  Takes dulcolax BID 

at home, pt wants to take more stool softeners to help prevent possible 

hemorrhoidal bleeding.  Denies hx of prolapsed hemorrhoids.  GI consult rec'd 

tx as noted above.  





PMH HTN: Kept on home HTN meds (losartan/HCTZ, metoprolol, amlodipine).  





PMH Hyperlipidemia: - continue atorvastatin 10mg





PMH Osteoporosis: - continue alendronate/cholecalciferol





PMH Hypothyroidism: - continue levothyroxine


This includes examination of the patient, discharge planning, medication 

reconciliation, and communication with other providers.


 (Fly Bey M.D.)


Total Time Spent:  Greater than 30 minutes


 (Srini Westbrook MD)


Discharge Instructions


Please refer to the electronic Patient Visit Report (Discharge Instructions) 

for additional information.


 (Fly Bey M.D.)





Follow-Up


- PCM


- Gastroenterology


 (Fly Bey M.D.)





Additional Copies To


Abraham Lopes M.D.





History


Resident Physician Supervision Note:





I was present with Dr. Bey during the history and exam. I discussed the 

case with the resident and agree with the findings and plan as documented in 

the note.  Any exceptions or clarifications are listed here.





Pt seen and examined in chair. Reports resolution of BRBPR and no subsequent 

complaint of abdominal pain. Denies epigastric pain, nausea, lightheadedness, HA

, CP/SOB, palptiations, paresthesias.


 (Srini Westbrook MD)


General Appearance:  no apparent distress, obese


Respiratory:  chest non-tender, lungs clear, normal breath sounds, no 

respiratory distress


Cardiovascular:  normal peripheral pulses, regular rate, rhythm, no edema, no 

murmur


Gastrointestinal:  normal bowel sounds, non tender, soft, no organomegaly


 (Srini Westbrook MD)


Assessment/Plan


78 y/o female h/o colon cancer s/p resection and internal hemorrhoids for new 

onset hematochezia





Hematochezia - resolved, HgB stable - GI consulted inpatient w/ recommendations 

as noted above. 


Constipation - bowel regimen with improvement for management of hemorrhoids


HTN - resume home medications


HLD - continue atorvastatin


Osteoporosis - continue alendronate/cholecalciferol


Hypothyroidism - continue levothyroxine





A total of 35 minutes was spent evaluating and managing this patient, 

coordinating care and discharge.


 (Srini Westbrook MD)

## 2017-09-14 NOTE — FAMILY MEDICINE PROGRESS NOTE
Progress Note


Date of Service


Sep 14, 2017.





Subjective


Pt evaluation today including:  conversation w/ patient, physical exam, chart 

review, lab review, review of studies


Found pt walking out of the bathroom, walking and speaking easily.  Says she 

continues to have some BRBPR with attempted BM this morning, still no BM for 

three days.  Denies any vomiting along the way, but was a little nauseous 

yesterday.  Never any abd pain.  Says hx of colon cancer, last colonoscopy in 

Apr 2017 normal.  Thinks it's hemorrhoids.  No reported overnight events or 

acute concerns besides BRBPR.





   Constitutional:  No fever, No chills


   Respiratory:  No cough, No shortness of breath


   Cardiovascular:  No chest pain, No edema


   Abdomen:  + nausea, + constipation, No pain, No vomiting, No diarrhea





Medications





Current Inpatient Medications








 Medications


  (Trade)  Dose


 Ordered  Sig/Sheree


 Route  Start Time


 Stop Time Status Last Admin


Dose Admin


 


 Ondansetron HCl


  (Zofran Inj)  4 mg  Q6H  PRN


 IV  9/14/17 03:15


 10/14/17 03:14   


 


 


 Amlodipine


 Besylate


  (Norvasc Tab)  5 mg  QAM


 PO  9/14/17 08:00


 10/14/17 08:59  9/14/17 08:02


5 MG


 


 Atorvastatin


 Calcium


  (Lipitor Tab)  10 mg  HS


 PO  9/14/17 21:00


 10/14/17 20:59   


 


 


 Bisacodyl


  (Dulcolax Tab)  5 mg  QAM


 PO  9/14/17 08:00


 10/14/17 08:59  9/14/17 08:01


5 MG


 


 Levothyroxine


 Sodium


  (Synthroid Tab)  75 mcg  DAILYBB


 PO  9/14/17 07:00


 10/14/17 06:59  9/14/17 08:02


75 MCG


 


 Metoprolol


 Succinate


  (Toprol Xl Tab)  50 mg  HS


 PO  9/14/17 21:00


 10/14/17 20:59   


 


 


 Multivitamins


  (Multivitamin


 Tab)  1 tab  HS


 PO  9/14/17 21:00


 10/14/17 20:59   


 


 


 Polyethylene


  (Miralax Powder


 Packet)  17 gm  DAILY


 PO  9/14/17 08:00


 10/14/17 08:59  9/14/17 08:01


17 GM


 


 Miscellaneous


  (Iv Fluids


 Completed)  1 ea  PRN  PRN


 N/A  9/14/17 03:30


 9/14/18 03:29   


 











Objective


Vital Signs











  Date Time  Temp Pulse Resp B/P (MAP) Pulse Ox O2 Delivery O2 Flow Rate FiO2


 


9/14/17 07:30 36.7 59 18 154/72 (99) 97 Room Air  


 


9/14/17 04:54 36.8 61 18 165/75  Room Air  


 


9/14/17 04:00 36.8 61 18 165/75 (105) 97 Room Air  


 


9/14/17 03:44  60 16 153/68 97   


 


9/14/17 03:19  60 16 153/68 97 Room Air  


 


9/14/17 01:21  60 16 143/86 97 Room Air  


 


9/14/17 00:39  68      


 


9/13/17 23:35 37.1 74 20 194/113 96 Room Air  











Physical Exam


General Appearance:  no apparent distress


Respiratory/Chest:  lungs clear, normal breath sounds


Cardiovascular:  regular rate, rhythm (borderline bradycardia, but regular)


Abdomen:  normal bowel sounds, non tender, soft


Extremities:  normal range of motion


Neurologic/Psychiatric:  alert, normal mood/affect





Laboratory Results


9/14/17 00:12








Red Blood Count 4.63, Mean Corpuscular Volume 89.8, Mean Corpuscular Hemoglobin 

30.7, Mean Corpuscular Hemoglobin Concent 34.1, Mean Platelet Volume 9.6, 

Neutrophils (%) (Auto) 62.9, Lymphocytes (%) (Auto) 23.3, Monocytes (%) (Auto) 

10.4, Eosinophils (%) (Auto) 3.1, Basophils (%) (Auto) 0.2, Neutrophils # (Auto

) 5.54, Lymphocytes # (Auto) 2.05, Monocytes # (Auto) 0.92, Eosinophils # (Auto

) 0.27, Basophils # (Auto) 0.02





9/14/17 05:36








9/14/17 05:31

















Test


  9/14/17


00:12 9/14/17


05:31


 


White Blood Count


  8.81 K/uL


(4.8-10.8) 


 


 


Red Blood Count


  4.63 M/uL


(4.2-5.4) 


 


 


Hemoglobin


  14.2 g/dL


(12.0-16.0) 


 


 


Hematocrit 41.6 % (37-47)  


 


Mean Corpuscular Volume


  89.8 fL


() 


 


 


Mean Corpuscular Hemoglobin


  30.7 pg


(25-34) 


 


 


Mean Corpuscular Hemoglobin


Concent 34.1 g/dl


(32-36) 


 


 


Platelet Count


  167 K/uL


(130-400) 


 


 


Mean Platelet Volume


  9.6 fL


(7.4-10.4) 


 


 


Neutrophils (%) (Auto) 62.9 %  


 


Lymphocytes (%) (Auto) 23.3 %  


 


Monocytes (%) (Auto) 10.4 %  


 


Eosinophils (%) (Auto) 3.1 %  


 


Basophils (%) (Auto) 0.2 %  


 


Neutrophils # (Auto)


  5.54 K/uL


(1.4-6.5) 


 


 


Lymphocytes # (Auto)


  2.05 K/uL


(1.2-3.4) 


 


 


Monocytes # (Auto)


  0.92 K/uL


(0.11-0.59) 


 


 


Eosinophils # (Auto)


  0.27 K/uL


(0-0.5) 


 


 


Basophils # (Auto)


  0.02 K/uL


(0-0.2) 


 


 


RDW Standard Deviation


  43.6 fL


(36.4-46.3) 


 


 


RDW Coefficient of Variation


  13.3 %


(11.5-14.5) 


 


 


Immature Granulocyte % (Auto) 0.1 %  


 


Immature Granulocyte # (Auto)


  0.01 K/uL


(0.00-0.02) 


 


 


Prothrombin Time


  10.0 SECONDS


(9.0-12.0) 


 


 


Prothromb Time International


Ratio 0.9 (0.9-1.1) 


  


 


 


Activated Partial


Thromboplast Time 25.3 SECONDS


(21.0-31.0) 


 


 


Partial Thromboplastin Ratio 1.0  


 


Anion Gap


  


  5.0 mmol/L


(3-11)


 


Est Creatinine Clear Calc


Drug Dose 


  63.1 ml/min 


 


 


Estimated GFR (


American) 


  96.9 


 


 


Estimated GFR (Non-


American 


  83.6 


 


 


BUN/Creatinine Ratio  26.9 (10-20) 


 


Calcium Level


  


  8.7 mg/dl


(8.5-10.1)


 


Magnesium Level


  


  2.3 mg/dl


(1.8-2.4)











Assessment and Plan


77 year old female admission for rectal bleeding. 





Rectal bleeding:  Suspect from internal hemorrhoids, hemodynamically stable.  

Ongoing bleeding in AM 14Sep with attempted BM.  H&H stable thus far.  Type & 

screen was AB positive, antibody negative.  


- Continue to monitor H&H today.  


- clear fluid diet


[ ] Routine GI consult





Constipation: Says she hasn't had a BM since around 11Sep.  Takes dulcolax BID 

at home, pt wants to take more stool softeners to help prevent possible 

hemorrhoidal bleeding.  Denies hx of prolapsed hemorrhoids.  


- continue Dulcolax


- add MiraLAX





PMH HTN: 


- hold losartan/HCTZ


- Continue metoprolol + amlodipine





PMH Hyperlipidemia: - continue atorvastatin 10mg





PMH Osteoporosis: - continue alendronate/cholecalciferol





PMH Hypothyroidism: - continue levothyroxine





Disposition: Observation status for lower GI bleed.  Chemical prophylaxis 

contraindicated with ongoing rectal bleed


DVT prophy: - SCDs + TEDs.  


Code status: - DNR as per patient wishes





Will discuss all the above on attending rounds this morning.  KINSEY, PGY1 Family 

Medicine





Resident Tracking


Resident Involvement:  Resident Care Provided


Care Provided:  Adult [facility] Medicine (inpt rounds)





History


Resident Physician Supervision Note:





I was present with Dr. Bey during the history and exam. I discussed the 

case with the resident and agree with the findings and plan as documented in 

the note.  Any exceptions or clarifications are listed here.





For full attending documentation, please see discharge summary from this day.


Assessment/Plan


78 y/o female h/o colon cancer s/p resection and internal hemorrhoids for new 

onset hematochezia





Hematochezia - resolved, HgB stable - GI consulted inpatient w/ recommendations 

as noted above. 


Constipation - bowel regimen with improvement for management of hemorrhoids


HTN - resume home medications


HLD - continue atorvastatin


Osteoporosis - continue alendronate/cholecalciferol


Hypothyroidism - continue levothyroxine

## 2017-09-14 NOTE — HISTORY AND PHYSICAL
History & Physical


Date & Time of Service:


Sep 14, 2017 at 02:19


Chief Complaint:


Bleeding From Rectum


Primary Care Physician:


Abraham Lopes M.D.


History of Present Illness


Source:  patient, clinic records, hospital records


Mrs Klinefelter is a 77 year old female with pertinent history of colon cancer s

/p chemotherapy and partial colectomy who presents to the ER with rectal 

bleeding. Started around 9pm. Bright red rectal bleeding with clots present. 5-

6 bloody bowel movements since then with large clots (around the size of a fist)

. Associated LLQ pain only with bowel movements; no pain currently. No nausea 

or vomiting. Known internal hemorrhoids - colonoscopy on 4/4/17 but never had 

them bleed before. No diverticulosis on that colonoscopy. She has been 

constipated without a bowel movement (except for recent blood) for the last 3 

days. Takes Dulcolax BID and sometimes takes MiraLAX. Passing flatus.





She denies any chest pain, shortness of breath, syncope, presyncope or vision 

changes.





Past Medical/Surgical History


Medical Problems:


(1) Choledocholithiasis with obstruction


Status: Resolved  





(2) Fall


Status: Resolved  





(3) Fracture of tibia and fibula


Status: Resolved  





(4) Gastrocnemius muscle tear


Status: Resolved  





(5) High cholesterol


Status: Chronic  





(6) Hypertension


Status: Chronic  





(7) Tibia/fibula fracture


Status: Resolved  





(8) Hypothyroidism





Family History





Diabetes mellitus


Heart disease





Social History


Smoking Status:  Never Smoker


Drug Use:  none


Marital Status:  


Housing status:  lives with family


Occupational Status:  retired





Immunizations


History of Influenza Vaccine:  Unknown


History of Tetanus Vaccine?:  Unknown


History of Pneumococcal:  Unknown


History of Hepatitis B Vaccine:  Unknown





Multi-Drug Resistant Organisms


History of MDRO:  No





Allergies


Coded Allergies:  


     No Known Allergies (Verified , 9/13/17)





Home Medications


Scheduled


Alendronate/Cholecalciferol (Fosamax+D 70MG/2800 Iu), 1 TABLET PO WK


Amlodipine Besylate (Norvasc), 5 MG PO QAM


Aspirin (Aspirin Ec), 81 MG PO QAM


Atorvastatin (Lipitor), 10 MG PO HS


Bisacodyl (Dulcolax), 1 TAB PO QAM


Calcium Carbonate-Vitamin D (Calcium + D), 2 TAB PO HS


Levothyroxine Sodium (Levothyroxine Sodium), 75 MCG PO QAM


Lisinopril/Hctz (Zestoretic 20MG/12.5MG), 1 TAB PO QAM


Metoprolol Succ (Toprol Xl) (Toprol-Xl), 50 MG PO HS


Multiple Vitamin (Multivitamin), 1 TABLET PO HS





Scheduled PRN


Ondansetron Hcl (Zofran), 8 MG SL Q6H PRN for Nausea





Review of Systems


Constitutional:  No fever, No chills


Eyes:  No worsening of vision


ENT:  No hearing loss


Respiratory:  No cough, No shortness of breath


Cardiovascular:  + edema (chronic), No chest pain, No orthopnea, No PND, No 

claudication


Abdomen:  No pain, No nausea, No vomiting, No diarrhea, No constipation


Genitourinary - Female:  No dysuria, No urinary frequency, No urinary urgency, 

No urinary incontinence, No urinary retention


Neurologic:  No paralysis, No weakness, No numbness/tingling, No balance 

problems


Endocrine:  No fatigue, No excessive thirst, No excessive urination


Hematologic / Lymphatic:  No abnormal bleeding/bruising


Integumentary:  No rash, No itch





Physical Exam


Vital Signs











  Date Time  Temp Pulse Resp B/P (MAP) Pulse Ox O2 Delivery O2 Flow Rate FiO2


 


9/14/17 01:21  60 16 143/86 97 Room Air  


 


9/14/17 00:39  68      


 


9/13/17 23:35 37.1 74 20 194/113 96 Room Air  








General Appearance:  WD/WN, no apparent distress


ENT:  pharynx normal


Respiratory/Chest:  chest non-tender, lungs clear, normal breath sounds, no 

respiratory distress, no accessory muscle use


Cardiovascular:  regular rate, rhythm, no murmur, normal peripheral pulses


Abdomen/GI:  normal bowel sounds, non tender, soft


Back:  no CVA tenderness


Extremities/Musculoskelatal:  no calf tenderness, normal capillary refill, + 

pedal edema (1+ b/l ankles only)


Neurologic/Psych:  CNs II-XII nml as tested, no motor/sensory deficits, alert, 

oriented x 3


Skin:  normal color, warm/dry, no rash


please see Dr Alberto's note for rectal exam. This was discussed with Dr Alberto 

and not repeated.





Diagnostics


Laboratory Results





Results Past 24 Hours








Test


  9/14/17


00:12 Range/Units


 


 


White Blood Count 8.81 4.8-10.8  K/uL


 


Red Blood Count 4.63 4.2-5.4  M/uL


 


Hemoglobin 14.2 12.0-16.0  g/dL


 


Hematocrit 41.6 37-47  %


 


Mean Corpuscular Volume 89.8   fL


 


Mean Corpuscular Hemoglobin 30.7 25-34  pg


 


Mean Corpuscular Hemoglobin


Concent 34.1


  32-36  g/dl


 


 


Platelet Count 167 130-400  K/uL


 


Mean Platelet Volume 9.6 7.4-10.4  fL


 


Neutrophils (%) (Auto) 62.9  %


 


Lymphocytes (%) (Auto) 23.3  %


 


Monocytes (%) (Auto) 10.4  %


 


Eosinophils (%) (Auto) 3.1  %


 


Basophils (%) (Auto) 0.2  %


 


Neutrophils # (Auto) 5.54 1.4-6.5  K/uL


 


Lymphocytes # (Auto) 2.05 1.2-3.4  K/uL


 


Monocytes # (Auto) 0.92 0.11-0.59  K/uL


 


Eosinophils # (Auto) 0.27 0-0.5  K/uL


 


Basophils # (Auto) 0.02 0-0.2  K/uL


 


RDW Standard Deviation 43.6 36.4-46.3  fL


 


RDW Coefficient of Variation 13.3 11.5-14.5  %


 


Immature Granulocyte % (Auto) 0.1  %


 


Immature Granulocyte # (Auto) 0.01 0.00-0.02  K/uL


 


Prothrombin Time


  10.0


  9.0-12.0


SECONDS


 


Prothromb Time International


Ratio 0.9


  0.9-1.1  


 


 


Activated Partial


Thromboplast Time 25.3


  21.0-31.0


SECONDS


 


Partial Thromboplastin Ratio 1.0  


 


Sodium Level 139 136-145  mmol/L


 


Potassium Level 3.4 3.5-5.1  mmol/L


 


Chloride Level 104   mmol/L


 


Carbon Dioxide Level 31 21-32  mmol/L


 


Anion Gap 4.0 3-11  mmol/L


 


Blood Urea Nitrogen 23 7-18  mg/dl


 


Creatinine


  0.82


  0.60-1.20


mg/dl


 


Est Creatinine Clear Calc


Drug Dose 53.9


   ml/min


 


 


Estimated GFR (


American) 80.0


   


 


 


Estimated GFR (Non-


American 69.0


   


 


 


BUN/Creatinine Ratio 27.4 10-20  


 


Random Glucose 145 70-99  mg/dl


 


Calcium Level 9.1 8.5-10.1  mg/dl











Impression


Assessment and Plan


77 year old female admission for rectal bleeding, not currently anemic





Lower GI Bleed - suspect from internal hemorrhoids, hemodynamically stable


- repeat CBC in morning


- type and screen done


- clear fluid diet


- Routine GI consult





Constipation


- continue Dulcolax


- add MiraLAX





HTN


- hold losartan/HCZT


- Continue metoprolol + amlodipine





Hyperlipidemia


- continue atorvastatin 10mg





Osteoporosis


- continue alendronate/cholecalciferol





Hypothyroidism


- continue levothyroxine





Code


- DNR as per patient wishes





VTE Prophylaxis


- SCDs + TEDs


- chemical prophylaxis contraindicated with ongoing GI bleed





Disposition


- observation status for lower GI bleed








Resident Physician Supervision Note:





Pt examined independently with daughter at bedside. I discussed the case with 

the resident and agree with the findings and plan as documented in the note.  

Any exceptions or clarifications are listed here: 





76 y/o F Hx HTN, colon CA - resected - hemorrhoids on colonoscopy 04/17 - 

presenting with copious BRBPR





OE 


AAO x 3


S1,2 R


CTAB


NT, ND, BS+


No CCE





P:


Pt placed on clears 


GI consult, trend Hb - hold diuretics pending GI eval 


Vitals, Hb stable on admission





Documented By:  Quan Harvey





Level of Care


Med/Surg





Advanced Directives


Existing Advance Directive:  No


Existing Living Will:  No


Existing Power of :  No





Resuscitation Status


DO NOT RESUSCITATE





VTE Prophylaxis


VTE Risk Assessment Done? Y/N:  Yes


Risk Level:  Moderate


Given or contraindicated:  T.E.D. Stockings, SCD's, Contraindicated (lower GI 

bleed)





Additional Copies To


Abraham Lopes M.D.





Resident Tracking


Resident Involvement:  Resident Care Provided


Care Provided:  Adult Hospital Medicine

## 2017-09-14 NOTE — GASTROINTESTINAL CONSULTATION
Gastrointestinal Consultation


Date of Consultation:  Sep 14, 2017


Attending Physician:  Dr. Harvey


Consulting Physician:  Dr. Paige/MIGUEL Lang


Reason for Consultation:  Rectal bleeding


History of Present Illness


Patient is a 77 year old female with a history of colon cancer status post 

colon resection in  with adjuvant chemotherapy last seen by Dr. Paige in 

April of this year for routine colorectal surveillance. At that time, she was 

noted to have a normal appearing anastomosis as well as nonbleeding internal 

hemorrhoids. Since the procedure, she states she had done well from a GI 

standpoint although she does have issues with chronic constipation. The patient 

is not consistent with a bowel regimen at home although she will take MiraLAX 

and Dulcolax if she has not had a bowel movement after 2-3 days. Yesterday, she 

developed a sudden onset of rectal bleeding after passing hard stool. She 

states she did have 5-6 bloody bowel movements with michael red blood and large 

clots. The only associated symptom was of left lower quadrant pain which has 

resolved this morning after being given a dose of MiraLAX in the hospital with 

"good results". She states the stool was soft to loose but no longer bloody 

this am. She states that she is overall feeling improved. Has been up and 

ambulating. Hgb on arrival was noted to be 14.2 and 13.2 this morning. She 

denies any chest pain, palpitations, shortness of breath, dizziness, syncope, 

or other complaints.





Past Medical/Surgical History


Medical Problems:


(1) Cholelithiasis


Status: Acute  





(2) Hypertensive urgency


Status: Acute  





(3) Lower GI bleed


Status: Acute  





Past Medical History:


1. Colon cancer


2. Tibia and fibula fracture


3. Hypercholesterolemia


4. Hypertension


5. Hypothyroidism


6. Gallstone pancreatitis


Past Surgical History:


1.  section


2. Colon resection


3. Repair of fracture





Family History





Diabetes mellitus


Heart disease


Negative for GI malignancy or IBD





Social History


Smoking Status:  Never Smoker


Drug Use:  none


Marital Status:  


Housing Status:  lives with family


Occupation Status:  retired





Allergies


Coded Allergies:  


     No Known Allergies (Verified , 17)





Current Medications





Home Meds and Scripts








 Medications  Dose


 Route/Sig


 Max Daily Dose Days Date Category Dose


Instructions


 


 Calcium + D


  (Calcium


 Carbonate-Vitamin


 D) 1 Tab Tab  2 Tab


 PO HS


    3/31/17 Reported 


 


 Dulcolax


  (Bisacodyl) 5 Mg


 Tab  1 Tab


 PO QAM


    3/31/17 Reported 


 


 Zofran


  (Ondansetron HCl)


 8 Mg Tab  8 Mg


 SL Q6H PRN


    3/31/17 Reported 


 


 Fosamax+D


 70MG/2800 Iu


  (Alendronate


 Sodium/Vitamin


 D3) 70 Mg Tab  1 Tablet


 PO WK


    3/31/17 Reported  TAKES ON .


 


 Zestoretic


 20MG/12.5MG


  (HCTZ/Lisinopril)


 Tab  1 Tab


 PO QAM


    16 Reported 


 


 Aspirin Ec


  (Aspirin) 81 Mg


 Tab  81 Mg


 PO QAM


    5/8/15 Reported 


 


 Lipitor


  (Atorvastatin


 Calcium) 10 Mg Tab  10 Mg


 PO HS


    5/8/15 Reported 


 


 Levothyroxine


 Sodium 75 Mcg Tab  75 Mcg


 PO QAM


    5/8/15 Reported 


 


 Multivitamin


  (Multiple


 Vitamin) 1 Tab Tab  1 Tablet


 PO HS


    10/5/12 Reported 


 


 Norvasc


  (Amlodipine


 Besylate) 5 Mg Tab  5 Mg


 PO QAM


    10/5/12 Reported 


 


 Toprol-Xl


  (Metoprolol


 Succinate) 50 Mg


 Tabcr  50 Mg


 PO HS


    10/5/12 Reported 











Review of Systems


See HPI for pertinent positives & negatives. A total of 10 systems reviewed and 

were otherwise negative.





Physical Exam











  Date Time  Temp Pulse Resp B/P (MAP) Pulse Ox O2 Delivery O2 Flow Rate FiO2


 


17 08:00      Room Air  


 


17 07:30 36.7 59 18 154/72 (99) 97 Room Air  


 


17 04:54 36.8 61 18 165/75  Room Air  


 


17 04:00 36.8 61 18 165/75 (105) 97 Room Air  


 


17 03:44  60 16 153/68 97   


 


17 03:19  60 16 153/68 97 Room Air  


 


17 01:21  60 16 143/86 97 Room Air  


 


17 00:39  68      


 


17 23:35 37.1 74 20 194/113 96 Room Air  








General Appearance:  no apparent distress


Eyes:  EOMI


ENT:  hearing grossly normal


Neck:  supple


Respiratory/Chest:  lungs clear, normal breath sounds, no respiratory distress


Cardiovascular:  regular rate, rhythm, no gallop, no murmur


Abdomen:  normal bowel sounds, non tender, soft


Extremities:  normal range of motion


Neurologic/Psych:  alert, normal mood/affect, oriented x 3


Skin:  warm/dry





Laboratory Results





Last 24 Hours








Test


  17


00:12 17


05:31 17


05:36


 


White Blood Count 8.81 K/uL   


 


Red Blood Count 4.63 M/uL   


 


Hemoglobin 14.2 g/dL   13.2 g/dL 


 


Hematocrit 41.6 %   39.2 % 


 


Mean Corpuscular Volume 89.8 fL   


 


Mean Corpuscular Hemoglobin 30.7 pg   


 


Mean Corpuscular Hemoglobin


Concent 34.1 g/dl 


  


  


 


 


Platelet Count 167 K/uL   


 


Mean Platelet Volume 9.6 fL   


 


Neutrophils (%) (Auto) 62.9 %   


 


Lymphocytes (%) (Auto) 23.3 %   


 


Monocytes (%) (Auto) 10.4 %   


 


Eosinophils (%) (Auto) 3.1 %   


 


Basophils (%) (Auto) 0.2 %   


 


Neutrophils # (Auto) 5.54 K/uL   


 


Lymphocytes # (Auto) 2.05 K/uL   


 


Monocytes # (Auto) 0.92 K/uL   


 


Eosinophils # (Auto) 0.27 K/uL   


 


Basophils # (Auto) 0.02 K/uL   


 


RDW Standard Deviation 43.6 fL   


 


RDW Coefficient of Variation 13.3 %   


 


Immature Granulocyte % (Auto) 0.1 %   


 


Immature Granulocyte # (Auto) 0.01 K/uL   


 


Prothrombin Time 10.0 SECONDS   


 


Prothromb Time International


Ratio 0.9 


  


  


 


 


Activated Partial


Thromboplast Time 25.3 SECONDS 


  


  


 


 


Partial Thromboplastin Ratio 1.0   


 


Sodium Level 139 mmol/L  140 mmol/L  


 


Potassium Level 3.4 mmol/L  3.4 mmol/L  


 


Chloride Level 104 mmol/L  105 mmol/L  


 


Carbon Dioxide Level 31 mmol/L  30 mmol/L  


 


Anion Gap 4.0 mmol/L  5.0 mmol/L  


 


Blood Urea Nitrogen 23 mg/dl  19 mg/dl  


 


Creatinine 0.82 mg/dl  0.70 mg/dl  


 


Est Creatinine Clear Calc


Drug Dose 53.9 ml/min 


  63.1 ml/min 


  


 


 


Estimated GFR (


American) 80.0 


  96.9 


  


 


 


Estimated GFR (Non-


American 69.0 


  83.6 


  


 


 


BUN/Creatinine Ratio 27.4  26.9  


 


Random Glucose 145 mg/dl  112 mg/dl  


 


Calcium Level 9.1 mg/dl  8.7 mg/dl  


 


Magnesium Level  2.3 mg/dl  











Impression


Patient is a 77 year old female with a history of colon cancer status post 

resection approximately 10 years ago admitted with LLQ pain and bright red 

rectal bleeding (improving) in the setting of constipation.





Diff dx: hemorrhoids vs diverticular bleeding vs ischemia vs other.





Plan


1. CT a/p with IV and oral enhancement to exclude any worrisome pathology.


2. Discussed regular bowel regimen with patient to include daily use of MiraLAX 

17 g to reduce constipation.


3. Ensure adequate fluid and fiber in the diet.


4. Additional recommendations pending results of testing. If CT negative, could 

consider discharge home as patient is feeling better clinically and is 

hemodynamically stable.





Thank you for allowing us to participate in the care of this pleasant patient. 

If you have any questions or concerns, please do not hesitate to contact us.





Addendum at 1407: CT reviewed. Questionable stranding in the pancreas (either 

chronic scarring vs acute pancreatitis. Was able to add amylase and lipase to 

blood drawn from AM labs per specimen receiving. Will await results. 

Additionally, there was possible ischemic inflammation at site of anastomosis (

can be seen in the setting of severe constipation). Will add Cipro and Flagyl 

for 10 days to prevent secondary bacterial translocation. Patient will need 

close outpatient follow up in our office.





Agree with MIGUEL Lang as above


Abd:  Soft, NT, ND, +BS


Complete 10 day course of Cipro/Flagyl


Followup in our office in 1-2 weeks.


Consider repeat CT scan in 1 month


Consider colonoscopy in 6 weeks

## 2017-09-15 VITALS
OXYGEN SATURATION: 98 % | SYSTOLIC BLOOD PRESSURE: 134 MMHG | DIASTOLIC BLOOD PRESSURE: 75 MMHG | TEMPERATURE: 97.88 F | HEART RATE: 59 BPM

## 2017-09-15 VITALS
OXYGEN SATURATION: 98 % | SYSTOLIC BLOOD PRESSURE: 134 MMHG | TEMPERATURE: 97.88 F | DIASTOLIC BLOOD PRESSURE: 75 MMHG | HEART RATE: 59 BPM

## 2017-09-15 RX ADMIN — BISACODYL SCH MG: 5 TABLET, COATED ORAL at 08:27

## 2017-09-15 RX ADMIN — Medication SCH GM: at 08:27

## 2017-09-15 RX ADMIN — LEVOTHYROXINE SODIUM SCH MCG: 75 TABLET ORAL at 05:53

## 2017-09-15 RX ADMIN — AMLODIPINE BESYLATE SCH MG: 5 TABLET ORAL at 08:27

## 2017-09-15 RX ADMIN — METRONIDAZOLE SCH MG: 500 TABLET ORAL at 08:26

## 2017-09-15 RX ADMIN — CIPROFLOXACIN SCH MG: 500 TABLET, FILM COATED ORAL at 08:27

## 2017-09-15 NOTE — FAMILY MEDICINE PROGRESS NOTE
Progress Note


Date of Service


Sep 15, 2017.





Subjective


Pt evaluation today including:  conversation w/ patient, physical exam, chart 

review, lab review


Found pt walking back from bathroom.  No overnight concerns or new complaints.  

Says some minimal blood with BM yesterday.  Denies any abdominal pain/cramping, 

N/V, CP, SOB, or other acute sx.





   Constitutional:  No fever, No chills


   Respiratory:  No cough, No shortness of breath


   Cardiovascular:  No chest pain, No edema


   Abdomen:  No pain, No nausea, No vomiting, No diarrhea





Medications





Current Inpatient Medications








 Medications


  (Trade)  Dose


 Ordered  Sig/Sheree


 Route  Start Time


 Stop Time Status Last Admin


Dose Admin


 


 Ondansetron HCl


  (Zofran Inj)  4 mg  Q6H  PRN


 IV  9/14/17 03:15


 10/14/17 03:14   


 


 


 Amlodipine


 Besylate


  (Norvasc Tab)  5 mg  QAM


 PO  9/14/17 08:00


 10/14/17 08:59  9/14/17 08:02


5 MG


 


 Atorvastatin


 Calcium


  (Lipitor Tab)  10 mg  HS


 PO  9/14/17 21:00


 10/14/17 20:59  9/14/17 20:10


10 MG


 


 Bisacodyl


  (Dulcolax Tab)  5 mg  QAM


 PO  9/14/17 08:00


 10/14/17 08:59  9/14/17 08:01


5 MG


 


 Levothyroxine


 Sodium


  (Synthroid Tab)  75 mcg  DAILYBB


 PO  9/14/17 07:00


 10/14/17 06:59  9/15/17 05:53


75 MCG


 


 Metoprolol


 Succinate


  (Toprol Xl Tab)  50 mg  HS


 PO  9/14/17 21:00


 10/14/17 20:59  9/14/17 20:11


50 MG


 


 Multivitamins


  (Multivitamin


 Tab)  1 tab  HS


 PO  9/14/17 21:00


 10/14/17 20:59  9/14/17 20:10


1 TAB


 


 Polyethylene


  (Miralax Powder


 Packet)  17 gm  DAILY


 PO  9/14/17 08:00


 10/14/17 08:59  9/14/17 08:01


17 GM


 


 Miscellaneous


  (Iv Fluids


 Completed)  1 ea  PRN  PRN


 N/A  9/14/17 03:30


 9/14/18 03:29   


 


 


 Ioversol


  (Optiray 320)  100 ml  UD  PRN


 IV  9/14/17 10:30


 9/18/17 10:29   


 


 


 Ciprofloxacin


  (Cipro Tab)  500 mg  BID


 PO  9/14/17 20:00


 9/24/17 19:59  9/14/17 20:10


500 MG


 


 Metronidazole


  (Flagyl Tab)  500 mg  TID


 PO  9/14/17 14:00


 9/24/17 13:59  9/14/17 20:10


500 MG


 


 HCTZ/Lisinopril


  (Prinzide


 20-12.5MG Tab)  1 tab  QAM


 PO  9/15/17 08:00


 10/15/17 07:59   


 











Objective


Vital Signs











  Date Time  Temp Pulse Resp B/P (MAP) Pulse Ox O2 Delivery O2 Flow Rate FiO2


 


9/15/17 07:13 36.6 59 18 134/75 (94) 98 Room Air  


 


9/15/17 00:00      Room Air  


 


9/14/17 23:07 36.7 79 18 132/80 (97) 97 Room Air  


 


9/14/17 20:09  87 18 140/75 (96)    


 


9/14/17 16:00     99 Room Air  


 


9/14/17 14:47 36.7 77 20 160/103 (122) 99 Room Air  


 


9/14/17 08:00      Room Air  











Physical Exam


General Appearance:  no apparent distress


Respiratory/Chest:  lungs clear, normal breath sounds, no respiratory distress


Cardiovascular:  regular rate, rhythm, no murmur


Abdomen:  normal bowel sounds, non tender, soft


Extremities:  normal range of motion, no pedal edema





Laboratory Results


9/14/17 17:59











Assessment and Plan


77 year old female with hx of colon cancer s/p resection (2007) was admitted 

for acute rectal bleeding. 





Rectal bleeding:  Initially suspected from internal hemorrhoids (pt has hx of 

same), hemodynamically stable.  Ongoing bleeding in AM 14Sep with attempted BM.

  H&H stable on serial monitoring.  Type & screen was AB positive, antibody 

negative.  GI consulted, greatly appreciated input.  Recd CT a/p which showed 

concern for ischemia.  Was placed on cipro and flagyl prophy to try to prevent 

gut bacterial translocation.  CT also showed some pancreatic findings.  Lipase 

and amylase on 14Sep were normal.  Pancreas findings likely related to her 

prior gallstone pancreatitis about a year ago.  





Constipation: Initially said she hasn't had a BM since around 11Sep.  Takes 

dulcolax BID at home, pt wants to take more stool softeners to help prevent 

possible hemorrhoidal bleeding.  Denies hx of prolapsed hemorrhoids.  On bowel 

regimen (Dulcolax, miralax) here, had BM on 14Sep. 





PMH HTN: On home metoprolol, amlodipine, and losartain/HCTZ.  





PMH Hyperlipidemia: - continue atorvastatin 10mg





PMH Osteoporosis: - continue alendronate/cholecalciferol





PMH Hypothyroidism: - continue levothyroxine





Disposition: Observation status for lower GI bleed.  Chemical prophylaxis 

contraindicated with ongoing rectal bleed


DVT prophy: - SCDs + TEDs.  


Code status: - DNR as per patient wishes





Will discuss all the above on attending rounds this morning.  KINSEY, PGY1 Family 

Medicine





Resident Tracking


Resident Involvement:  Resident Care Provided


Care Provided:  Adult [facility] Medicine (inpt rounds)





History


Resident Physician Supervision Note:





I was present with Dr. Bey during the history and exam. I discussed the 

case with the resident and agree with the findings and plan as documented in 

the note.  Any exceptions or clarifications are listed here.





Though pt had a few dark BM overnight, no complaints at this time. No BM yet 

today.


General Appearance:  WD/WN, no apparent distress


Respiratory:  chest non-tender, lungs clear, normal breath sounds, no 

respiratory distress


Cardiovascular:  normal peripheral pulses, regular rate, rhythm, no edema, no 

murmur


Gastrointestinal:  normal bowel sounds, non tender, soft, no organomegaly


Assessment/Plan


78 y/o female h/o colon cancer s/p resection and internal hemorrhoids for new 

onset hematochezia





Hematochezia - resolved, HgB stable - GI consulted inpatient w/ recommendations 

as noted above. 


Constipation - bowel regimen with improvement for management of hemorrhoids


HTN - resume home medications


HLD - continue atorvastatin


Osteoporosis - continue alendronate/cholecalciferol


Hypothyroidism - continue levothyroxine

## 2017-09-15 NOTE — GASTROENTEROLOGY PROGRESS NOTE
Progress Note


Date of Service:  Sep 15, 2017


Subjective


Pt evaluation today including:  conversation w/ patient, physical exam, lab 

review, review of studies, review of inpatient medication list


Patient reports feeling well today. She denies any abdominal pain, diarrhea or 

bloody stool this morning. Tolerating regular diet. Remains hemodynamically 

stable. Continues oral antibiotics.





Review of Systems


Constitutional:  No problem reported


Abdomen:  No problem reported


Psych:  No problem reported





Medications





Current Inpatient Medications








 Medications


  (Trade)  Dose


 Ordered  Sig/Sheree


 Route  Start Time


 Stop Time Status Last Admin


Dose Admin


 


 Ondansetron HCl


  (Zofran Inj)  4 mg  Q6H  PRN


 IV  9/14/17 03:15


 10/14/17 03:14   


 


 


 Amlodipine


 Besylate


  (Norvasc Tab)  5 mg  QAM


 PO  9/14/17 08:00


 10/14/17 08:59  9/15/17 08:27


5 MG


 


 Atorvastatin


 Calcium


  (Lipitor Tab)  10 mg  HS


 PO  9/14/17 21:00


 10/14/17 20:59  9/14/17 20:10


10 MG


 


 Bisacodyl


  (Dulcolax Tab)  5 mg  QAM


 PO  9/14/17 08:00


 10/14/17 08:59  9/15/17 08:27


5 MG


 


 Levothyroxine


 Sodium


  (Synthroid Tab)  75 mcg  DAILYBB


 PO  9/14/17 07:00


 10/14/17 06:59  9/15/17 05:53


75 MCG


 


 Metoprolol


 Succinate


  (Toprol Xl Tab)  50 mg  HS


 PO  9/14/17 21:00


 10/14/17 20:59  9/14/17 20:11


50 MG


 


 Multivitamins


  (Multivitamin


 Tab)  1 tab  HS


 PO  9/14/17 21:00


 10/14/17 20:59  9/14/17 20:10


1 TAB


 


 Polyethylene


  (Miralax Powder


 Packet)  17 gm  DAILY


 PO  9/14/17 08:00


 10/14/17 08:59  9/15/17 08:27


17 GM


 


 Miscellaneous


  (Iv Fluids


 Completed)  1 ea  PRN  PRN


 N/A  9/14/17 03:30


 9/14/18 03:29   


 


 


 Ioversol


  (Optiray 320)  100 ml  UD  PRN


 IV  9/14/17 10:30


 9/18/17 10:29   


 


 


 Ciprofloxacin


  (Cipro Tab)  500 mg  BID


 PO  9/14/17 20:00


 9/24/17 19:59  9/15/17 08:27


500 MG


 


 Metronidazole


  (Flagyl Tab)  500 mg  TID


 PO  9/14/17 14:00


 9/24/17 13:59  9/15/17 08:26


500 MG


 


 HCTZ/Lisinopril


  (Prinzide


 20-12.5MG Tab)  1 tab  QAM


 PO  9/15/17 08:00


 10/15/17 07:59  9/15/17 08:26


1 TAB











Objective


Vital Signs











  Date Time  Temp Pulse Resp B/P (MAP) Pulse Ox O2 Delivery O2 Flow Rate FiO2


 


9/15/17 08:04      Room Air  


 


9/15/17 07:13 36.6 59 18 134/75 (94) 98 Room Air  


 


9/15/17 00:00      Room Air  


 


9/14/17 23:07 36.7 79 18 132/80 (97) 97 Room Air  


 


9/14/17 20:09  87 18 140/75 (96)    


 


9/14/17 16:00     99 Room Air  


 


9/14/17 14:47 36.7 77 20 160/103 (122) 99 Room Air  











Physical Exam


General Appearance:  no apparent distress


Eyes:  EOMI


Respiratory/Chest:  lungs clear, normal breath sounds, no respiratory distress


Cardiovascular:  regular rate, rhythm, no gallop, no murmur


Abdomen:  normal bowel sounds, non tender, soft


Neurologic/Psych:  alert, normal mood/affect, oriented x 3


Skin:  warm/dry





Laboratory Results





Last 24 Hours








Test


  9/14/17


12:39 9/14/17


17:59


 


Hemoglobin 13.4 g/dL  13.9 g/dL 


 


Hematocrit 39.0 %  40.8 % 











Assessment and Plan


Patient is a 77 year old female with a history of colon cancer status post 

resection approximately 10 years ago admitted with LLQ pain and bright red 

rectal bleeding (improving) in the setting of constipation with CT imaging 

suggestive ischemic colitis at the site of anastomosis.





1. Complete 10 day course of Cipro and Flagyl.


2. Daily MiraLAX 17 g in 8 ounces of liquid.


3. Dulcolax as needed.


4. CT a/p in 1 month.


5. Follow up in the office within 2 weeks.


6. Okay to discharge today from GI standpoint.





Agree with MIGUEL Lang as above


Abd:  Soft, NT, ND, +BS


Continue current therapy


Bowel regimen as above


Followup in our office in 10 days

## 2017-09-15 NOTE — DISCHARGE INSTRUCTIONS
Discharge Instructions


Date of Service


Sep 15, 2017.





Admission


Reason for Admission:  Lower Gi Bleed





Discharge


Discharge Diagnosis / Problem:  Lower GI bleed





Discharge Goals


Goal(s):  Learn about illness





Activity Recommendations


Activity Limitations:  resume your previous activity





.





Instructions / Follow-Up


Instructions / Follow-Up


You were admitted for bleeding from your intestines and seen by 

gastroenterology. 


- Your two antibiotic prescriptions were sent to [facility].  Please 

take them for nine more days to help prevent an infection.  They recommended a 

high-fiber diet as well as daily miralax.  You can add dulcolax as needed.  


- Follow up with gastroenterology in 1-2 weeks for a recheck.


- Also follow up with your primary care provider for ongoing primary care.


- Return to the nearest emergency department if you notice a return of any 

worrisome or excessive bleeding from your bottom, any abdominal pain, fevers, 

or other acute concerns.





Current Hospital Diet


Patient's current hospital diet: Regular Diet





Discharge Diet


Recommended Diet:  Regular Diet





Procedures


Procedures Performed:  


CT scan of the abdomen and pelvis





Pending Studies


Studies pending at discharge:  no





Medical Emergencies








.


Who to Call and When:





Medical Emergencies:  If at any time you feel your situation is an emergency, 

please call 911 immediately.





.





Non-Emergent Contact


Non-Emergency issues call your:  Primary Care Provider, Gastroenterologist





.


.








"Provider Documentation" section prepared by Jt Bey.








.





VTE Core Measure


Inpt VTE Proph given/why not?:  T.E.D. Stockings, SCD's, Contraindicated (lower 

GI bleed)

## 2017-10-16 ENCOUNTER — HOSPITAL ENCOUNTER (OUTPATIENT)
Dept: HOSPITAL 45 - C.CTS | Age: 77
Discharge: HOME | End: 2017-10-16
Attending: NURSE PRACTITIONER
Payer: COMMERCIAL

## 2017-10-16 DIAGNOSIS — K55.9: Primary | ICD-10-CM

## 2017-10-16 NOTE — DIAGNOSTIC IMAGING REPORT
ABDOMEN AND PELVIS CT WITH IV AND ORAL CONTRAST



CT DOSE: 560.47 mGy.cm



HISTORY: Generalized abdominal pain.  ISCHEMIC COLITIS



TECHNIQUE: Multiaxial CT images of the abdomen and pelvis were performed

following the use of intravenous and oral contrast.  A dose lowering technique

was utilized adhering to the principles of ALARA.



COMPARISON STUDY: Abdomen and pelvis CT 9/14/2017.



FINDINGS: The lung bases are clear. No pneumoperitoneum. No pneumatosis. No

fractures within the visualized osseous structures. The gallbladder surgically

absent. No hepatic or splenic masses. The adrenal glands and kidneys are

unremarkable. Normal pancreas. Mild central mesenteric fat stranding. This is

similar to the prior study. No retroperitoneal lymphadenopathy. Calcified

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 Normal bladder. Prior colonic anastomosis at

the descending colon/sigmoid colon junction. Large amount well-formed stool seen

within the majority of the colon most pronounced at the site of anastomosis.

Distal to the anastomosis of the sigmoid colon remains decompressed. This is

similar to the prior study. The colonic wall thickening has resolved in the

interval.



IMPRESSION: 



1. Interval resolution of the colonic wall thickening.

2. Large amount of well-formed stool seen throughout the colon most pronounced

at the anastomosis. Distal to the anastomosis the sigmoid colon is significantly

decompressed. Therefore, this raises the possibility of a partial large bowel

obstruction with the transition point at the anastomosis.

3. Mild mesenteric fat stranding and a few prominent mesenteric lymph nodes are

again noted. This favors a mesenteric panniculitis.







Electronically signed by:  Loyd Stark M.D.

10/16/2017 1:15 PM



Dictated Date/Time:  10/16/2017 1:02 PM

## 2018-01-10 ENCOUNTER — HOSPITAL ENCOUNTER (OUTPATIENT)
Dept: HOSPITAL 45 - C.MAMM | Age: 78
Discharge: HOME | End: 2018-01-10
Attending: FAMILY MEDICINE
Payer: COMMERCIAL

## 2018-01-10 DIAGNOSIS — Z12.31: Primary | ICD-10-CM

## 2018-01-10 NOTE — MAMMOGRAPHY REPORT
BILATERAL DIGITAL SCREENING MAMMOGRAM TOMOSYNTHESIS WITH CAD: 1/10/2018

CLINICAL HISTORY: Routine screening.  Patient has no complaints.  





TECHNIQUE:  Breast tomosynthesis in addition to standard 2D mammography was performed. Current study 
was also evaluated with a Computer Aided Detection (CAD) system.  



COMPARISON: Comparison is made to exams dated:  1/20/2017 mammogram, 1/9/2017 mammogram, 1/7/2016 stiven
mogram, 12/4/2014 mammogram, 12/3/2013 mammogram, and 11/29/2012 mammogram - [facility]
nter.   



BREAST COMPOSITION:  There are scattered areas of fibroglandular density in both breasts.  



FINDINGS:  No suspicious masses, calcifications, or areas of architectural distortion are noted in ei
ther breast. There has been no significant interval change compared to prior exams.  There are post b
iopsy changes including density and 2 adjacent biopsy marker clips at the site of prior benign stereo
tactic biopsy in the left lower inner quadrant.  Bilateral benign-appearing calcifications are not si
gnificantly changed.



IMPRESSION:  ACR BI-RADS CATEGORY 2: BENIGN

There is no mammographic evidence of malignancy. A 1 year screening mammogram is recommended.  The pa
tient will receive written notification of the results.  





Approximately 10% of breast cancers are not detected with mammography. A negative mammographic report
 should not delay biopsy if a clinically suggestive mass is present.



Uma Alfaro M.D.          

/:1/10/2018 12:38:55  



Imaging Technologist: Betty Corona RT(R)(M), [facility]

letter sent: Normal 1/2  

BI-RADS Code: ACR BI-RADS Category 2: Benign

## 2018-01-11 ENCOUNTER — HOSPITAL ENCOUNTER (OUTPATIENT)
Dept: HOSPITAL 45 - C.LABPVFM | Age: 78
Discharge: HOME | End: 2018-01-11
Attending: FAMILY MEDICINE
Payer: COMMERCIAL

## 2018-01-11 DIAGNOSIS — E78.5: ICD-10-CM

## 2018-01-11 DIAGNOSIS — I10: Primary | ICD-10-CM

## 2018-01-11 DIAGNOSIS — E21.3: ICD-10-CM

## 2018-01-11 DIAGNOSIS — R73.01: ICD-10-CM

## 2018-01-11 DIAGNOSIS — E03.9: ICD-10-CM

## 2018-01-11 LAB
ALBUMIN SERPL-MCNC: 3.6 GM/DL (ref 3.4–5)
ALP SERPL-CCNC: 52 U/L (ref 45–117)
ALT SERPL-CCNC: 21 U/L (ref 12–78)
AST SERPL-CCNC: 17 U/L (ref 15–37)
BUN SERPL-MCNC: 15 MG/DL (ref 7–18)
CALCIUM SERPL-MCNC: 8.9 MG/DL (ref 8.5–10.1)
CO2 SERPL-SCNC: 29 MMOL/L (ref 21–32)
CREAT SERPL-MCNC: 0.78 MG/DL (ref 0.6–1.2)
GLUCOSE SERPL-MCNC: 95 MG/DL (ref 70–99)
KETONES UR QL STRIP: 47 MG/DL
PH UR: 133 MG/DL (ref 0–200)
PHOSPHATE SERPL-MCNC: 3.1 MG/DL (ref 2.5–4.9)
POTASSIUM SERPL-SCNC: 3.9 MMOL/L (ref 3.5–5.1)
PROT SERPL-MCNC: 7 GM/DL (ref 6.4–8.2)
SODIUM SERPL-SCNC: 139 MMOL/L (ref 136–145)

## 2018-02-21 ENCOUNTER — HOSPITAL ENCOUNTER (OUTPATIENT)
Dept: HOSPITAL 45 - C.LABPVFM | Age: 78
Discharge: HOME | End: 2018-02-21
Attending: INTERNAL MEDICINE
Payer: COMMERCIAL

## 2018-02-21 DIAGNOSIS — E03.9: Primary | ICD-10-CM

## 2018-02-21 DIAGNOSIS — E21.3: ICD-10-CM

## 2018-02-21 DIAGNOSIS — E83.51: ICD-10-CM
